# Patient Record
Sex: MALE | Race: WHITE | Employment: OTHER | URBAN - NONMETROPOLITAN AREA
[De-identification: names, ages, dates, MRNs, and addresses within clinical notes are randomized per-mention and may not be internally consistent; named-entity substitution may affect disease eponyms.]

---

## 2017-06-22 ENCOUNTER — OFFICE VISIT (OUTPATIENT)
Dept: FAMILY MEDICINE CLINIC | Age: 47
End: 2017-06-22

## 2017-06-22 VITALS
BODY MASS INDEX: 33.3 KG/M2 | WEIGHT: 224.8 LBS | HEIGHT: 69 IN | DIASTOLIC BLOOD PRESSURE: 80 MMHG | HEART RATE: 76 BPM | SYSTOLIC BLOOD PRESSURE: 130 MMHG

## 2017-06-22 DIAGNOSIS — E78.5 HYPERLIPIDEMIA, UNSPECIFIED HYPERLIPIDEMIA TYPE: Chronic | ICD-10-CM

## 2017-06-22 DIAGNOSIS — R53.82 CHRONIC FATIGUE: ICD-10-CM

## 2017-06-22 DIAGNOSIS — Z00.00 WELL ADULT HEALTH CHECK: Primary | ICD-10-CM

## 2017-06-22 PROCEDURE — 99396 PREV VISIT EST AGE 40-64: CPT | Performed by: FAMILY MEDICINE

## 2017-06-22 RX ORDER — TRAZODONE HYDROCHLORIDE 50 MG/1
50 TABLET ORAL NIGHTLY PRN
Qty: 30 TABLET | Refills: 2 | Status: SHIPPED | OUTPATIENT
Start: 2017-06-22 | End: 2018-06-28 | Stop reason: CLARIF

## 2017-06-22 ASSESSMENT — PATIENT HEALTH QUESTIONNAIRE - PHQ9
1. LITTLE INTEREST OR PLEASURE IN DOING THINGS: 0
SUM OF ALL RESPONSES TO PHQ QUESTIONS 1-9: 0
2. FEELING DOWN, DEPRESSED OR HOPELESS: 0
SUM OF ALL RESPONSES TO PHQ9 QUESTIONS 1 & 2: 0

## 2017-06-22 ASSESSMENT — ENCOUNTER SYMPTOMS
GASTROINTESTINAL NEGATIVE: 1
SHORTNESS OF BREATH: 0
RESPIRATORY NEGATIVE: 1

## 2017-06-23 ENCOUNTER — TELEPHONE (OUTPATIENT)
Dept: FAMILY MEDICINE CLINIC | Age: 47
End: 2017-06-23

## 2017-06-23 LAB
ABSOLUTE BASO #: 0.1 K/UL (ref 0–0.1)
ABSOLUTE EOS #: 0.2 K/UL (ref 0.1–0.4)
ABSOLUTE LYMPH #: 1.3 K/UL (ref 0.8–5.2)
ABSOLUTE MONO #: 0.7 K/UL (ref 0.1–0.9)
ABSOLUTE NEUT #: 3.8 K/UL (ref 1.3–9.1)
ALBUMIN SERPL-MCNC: 4.4 G/DL (ref 3.2–5.3)
ALK PHOSPHATASE: 76 IU/L (ref 35–121)
ALT SERPL-CCNC: 53 IU/L (ref 5–59)
ANION GAP SERPL CALCULATED.3IONS-SCNC: 13 MMOL/L
AST SERPL-CCNC: 24 IU/L (ref 10–42)
BASOPHILS RELATIVE PERCENT: 0.8 %
BILIRUB SERPL-MCNC: 0.6 MG/DL (ref 0.2–1.3)
BUN BLDV-MCNC: 13 MG/DL (ref 10–20)
CALCIUM SERPL-MCNC: 9.7 MG/DL (ref 8.7–10.8)
CHLORIDE BLD-SCNC: 104 MMOL/L (ref 95–111)
CHOLESTEROL/HDL RATIO: 4.5
CHOLESTEROL: 241 MG/DL
CO2: 27 MMOL/L (ref 21–32)
CREAT SERPL-MCNC: 0.8 MG/DL (ref 0.5–1.3)
EGFR AFRICAN AMERICAN: 125
EGFR IF NONAFRICAN AMERICAN: 103
EOSINOPHILS RELATIVE PERCENT: 2.9 %
GLUCOSE: 102 MG/DL (ref 70–100)
HCT VFR BLD CALC: 47.7 % (ref 41.4–51)
HDLC SERPL-MCNC: 54 MG/DL (ref 40–60)
HEMOGLOBIN: 16 G/DL (ref 13.8–17)
LDL CHOLESTEROL CALCULATED: 157 MG/DL
LDL/HDL RATIO: 2.9
LYMPHOCYTE %: 21.6 %
MCH RBC QN AUTO: 31.1 PG (ref 27–34)
MCHC RBC AUTO-ENTMCNC: 33.5 G/DL (ref 31–36)
MCV RBC AUTO: 92.6 FL (ref 80–100)
MONOCYTES # BLD: 12 %
NEUTROPHILS RELATIVE PERCENT: 62.2 %
PDW BLD-RTO: 12.9 % (ref 10.8–14.8)
PLATELETS: 305 K/UL (ref 150–450)
POTASSIUM SERPL-SCNC: 4.8 MMOL/L (ref 3.5–5.4)
RBC: 5.15 M/UL (ref 4–5.5)
SODIUM BLD-SCNC: 139 MMOL/L (ref 134–147)
TESTOSTERONE TOTAL: 159 NG/DL
TOTAL PROTEIN: 6.9 G/DL (ref 5.8–8)
TRIGL SERPL-MCNC: 150 MG/DL
TSH SERPL DL<=0.05 MIU/L-ACNC: 1.56 UIU/ML (ref 0.4–4.4)
VLDLC SERPL CALC-MCNC: 30 MG/DL
WBC: 6.2 K/UL (ref 3.7–10.8)

## 2017-06-29 ENCOUNTER — TELEPHONE (OUTPATIENT)
Dept: FAMILY MEDICINE CLINIC | Age: 47
End: 2017-06-29

## 2018-01-05 ENCOUNTER — OFFICE VISIT (OUTPATIENT)
Dept: FAMILY MEDICINE CLINIC | Age: 48
End: 2018-01-05
Payer: COMMERCIAL

## 2018-01-05 VITALS
BODY MASS INDEX: 34.24 KG/M2 | WEIGHT: 231.2 LBS | HEART RATE: 60 BPM | TEMPERATURE: 97.9 F | HEIGHT: 69 IN | SYSTOLIC BLOOD PRESSURE: 132 MMHG | DIASTOLIC BLOOD PRESSURE: 70 MMHG

## 2018-01-05 DIAGNOSIS — J01.90 ACUTE NON-RECURRENT SINUSITIS, UNSPECIFIED LOCATION: ICD-10-CM

## 2018-01-05 PROCEDURE — 99212 OFFICE O/P EST SF 10 MIN: CPT | Performed by: FAMILY MEDICINE

## 2018-01-05 RX ORDER — AMOXICILLIN AND CLAVULANATE POTASSIUM 500; 125 MG/1; MG/1
1 TABLET, FILM COATED ORAL 2 TIMES DAILY
Qty: 20 TABLET | Refills: 0 | Status: SHIPPED | OUTPATIENT
Start: 2018-01-05 | End: 2018-01-15

## 2018-01-06 NOTE — PROGRESS NOTES
Name:  Kartik Abreu  :    1970    Chief Complaint   Patient presents with    Sinusitis    Cough     No GI  HPI:  One week of URI symptoms and now headache and congestion. Whole family sick. No fever. No treatment. Chest is clear. Physical Exam:      /70 (Site: Left Arm, Position: Sitting, Cuff Size: Medium Adult)   Pulse 60   Temp 97.9 °F (36.6 °C) (Oral)   Ht 5' 9\" (1.753 m)   Wt 231 lb 3.2 oz (104.9 kg)   BMI 34.14 kg/m²     Not ill. ENT:   TM's congested and pink. Phar is red with edema. No nodes. Lungs are clear. Heart in RRR with no murmur. Assessment/Plan:      Sinusitis    Deyvi Doyle was seen today for sinusitis and cough. Diagnoses and all orders for this visit:    Acute non-recurrent sinusitis, unspecified location  -     amoxicillin-clavulanate (AUGMENTIN) 500-125 MG per tablet;  Take 1 tablet by mouth 2 times daily for 10 days

## 2018-02-07 ENCOUNTER — HOSPITAL ENCOUNTER (EMERGENCY)
Age: 48
Discharge: HOME | End: 2018-02-07
Payer: COMMERCIAL

## 2018-02-07 VITALS
WEIGHT: 230 LBS | SYSTOLIC BLOOD PRESSURE: 137 MMHG | HEART RATE: 79 BPM | DIASTOLIC BLOOD PRESSURE: 83 MMHG | TEMPERATURE: 97.9 F | HEIGHT: 69 IN | BODY MASS INDEX: 34.07 KG/M2 | OXYGEN SATURATION: 95 %

## 2018-02-07 DIAGNOSIS — L24.89 IRRITANT CONTACT DERMATITIS DUE TO OTHER AGENTS: Primary | ICD-10-CM

## 2018-02-07 PROCEDURE — 6360000002 HC RX W HCPCS: Performed by: NURSE PRACTITIONER

## 2018-02-07 PROCEDURE — 96372 THER/PROPH/DIAG INJ SC/IM: CPT

## 2018-02-07 PROCEDURE — 99212 OFFICE O/P EST SF 10 MIN: CPT

## 2018-02-07 RX ORDER — METHYLPREDNISOLONE 4 MG/1
TABLET ORAL
Qty: 1 KIT | Refills: 0 | Status: SHIPPED | OUTPATIENT
Start: 2018-02-07 | End: 2018-02-13

## 2018-02-07 RX ORDER — METHYLPREDNISOLONE ACETATE 80 MG/ML
80 INJECTION, SUSPENSION INTRA-ARTICULAR; INTRALESIONAL; INTRAMUSCULAR; SOFT TISSUE ONCE
Status: COMPLETED | OUTPATIENT
Start: 2018-02-07 | End: 2018-02-07

## 2018-02-07 RX ADMIN — METHYLPREDNISOLONE ACETATE 80 MG: 80 INJECTION, SUSPENSION INTRA-ARTICULAR; INTRALESIONAL; INTRAMUSCULAR; SOFT TISSUE at 15:00

## 2018-02-07 ASSESSMENT — ENCOUNTER SYMPTOMS
EYE DISCHARGE: 0
RHINORRHEA: 0
NAUSEA: 0
SHORTNESS OF BREATH: 0
WHEEZING: 0
COUGH: 0
CONSTIPATION: 0
SORE THROAT: 0
STRIDOR: 0
EYE ITCHING: 1
ABDOMINAL PAIN: 0
COLOR CHANGE: 0
DIARRHEA: 0
VOMITING: 0
BACK PAIN: 0
EYE PAIN: 0

## 2018-06-28 ENCOUNTER — OFFICE VISIT (OUTPATIENT)
Dept: FAMILY MEDICINE CLINIC | Age: 48
End: 2018-06-28
Payer: COMMERCIAL

## 2018-06-28 VITALS — SYSTOLIC BLOOD PRESSURE: 120 MMHG | HEART RATE: 70 BPM | DIASTOLIC BLOOD PRESSURE: 80 MMHG

## 2018-06-28 DIAGNOSIS — F51.01 PRIMARY INSOMNIA: ICD-10-CM

## 2018-06-28 DIAGNOSIS — Z00.00 WELL ADULT HEALTH CHECK: Primary | ICD-10-CM

## 2018-06-28 PROCEDURE — 99396 PREV VISIT EST AGE 40-64: CPT | Performed by: FAMILY MEDICINE

## 2018-06-28 RX ORDER — TRAZODONE HYDROCHLORIDE 50 MG/1
50 TABLET ORAL NIGHTLY PRN
Qty: 30 TABLET | Refills: 2 | Status: SHIPPED | OUTPATIENT
Start: 2018-06-28 | End: 2019-07-04 | Stop reason: ALTCHOICE

## 2018-06-28 ASSESSMENT — PATIENT HEALTH QUESTIONNAIRE - PHQ9
1. LITTLE INTEREST OR PLEASURE IN DOING THINGS: 0
2. FEELING DOWN, DEPRESSED OR HOPELESS: 0
SUM OF ALL RESPONSES TO PHQ9 QUESTIONS 1 & 2: 0
SUM OF ALL RESPONSES TO PHQ QUESTIONS 1-9: 0

## 2018-06-29 ENCOUNTER — NURSE ONLY (OUTPATIENT)
Dept: FAMILY MEDICINE CLINIC | Age: 48
End: 2018-06-29
Payer: COMMERCIAL

## 2018-06-29 DIAGNOSIS — Z00.00 WELL ADULT HEALTH CHECK: ICD-10-CM

## 2018-06-29 LAB
CHOLESTEROL, TOTAL: 213 MG/DL (ref 100–199)
GLUCOSE FASTING: 103 MG/DL (ref 70–108)
HDLC SERPL-MCNC: 49 MG/DL
LDL CHOLESTEROL CALCULATED: 136 MG/DL
TRIGL SERPL-MCNC: 139 MG/DL (ref 0–199)

## 2018-06-29 PROCEDURE — 36415 COLL VENOUS BLD VENIPUNCTURE: CPT | Performed by: FAMILY MEDICINE

## 2018-06-29 ASSESSMENT — ENCOUNTER SYMPTOMS
SHORTNESS OF BREATH: 0
RESPIRATORY NEGATIVE: 1
GASTROINTESTINAL NEGATIVE: 1
ABDOMINAL PAIN: 0

## 2018-12-03 ENCOUNTER — OFFICE VISIT (OUTPATIENT)
Dept: FAMILY MEDICINE CLINIC | Age: 48
End: 2018-12-03
Payer: COMMERCIAL

## 2018-12-03 VITALS
SYSTOLIC BLOOD PRESSURE: 128 MMHG | WEIGHT: 217.6 LBS | HEIGHT: 69 IN | HEART RATE: 80 BPM | TEMPERATURE: 99.3 F | BODY MASS INDEX: 32.23 KG/M2 | DIASTOLIC BLOOD PRESSURE: 70 MMHG

## 2018-12-03 DIAGNOSIS — R10.31 RIGHT LOWER QUADRANT ABDOMINAL PAIN: Primary | ICD-10-CM

## 2018-12-03 PROCEDURE — 99213 OFFICE O/P EST LOW 20 MIN: CPT | Performed by: FAMILY MEDICINE

## 2018-12-03 RX ORDER — NAPROXEN 250 MG/1
250 TABLET ORAL 2 TIMES DAILY WITH MEALS
COMMUNITY
End: 2019-07-04 | Stop reason: ALTCHOICE

## 2018-12-03 NOTE — PROGRESS NOTES
Name:  Maricel Elias  :    1970    Chief Complaint   Patient presents with    Abdominal Pain     3 weeks       HPI:  52 y/o male who is status post total colectomy now having 3 weeks of progressive RLQ pain that is referred into right hip girdle. Affecting gait. Affecting sleep. No injury. No fever. No  symptoms. No weight loss or melena. Physical Exam:      /70 (Site: Left Upper Arm, Position: Sitting, Cuff Size: Large Adult)   Pulse 80   Temp 99.3 °F (37.4 °C) (Oral)   Ht 5' 9\" (1.753 m)   Wt 217 lb 9.6 oz (98.7 kg)   BMI 32.13 kg/m²     Not ill. Neck normal.   Lungs are clear. Heart in RRR with no murmur. ABD is soft with moderate fullness and tenderness over [place] that is referred to mid lower abd and right hip. Good painless passive hip motion. Pain with valsalva. Quiet abd. Assessment/Plan:      3 weeks of progressive ABD pain  We need to image, as time is not relieving    Nikolai El was seen today for abdominal pain. Diagnoses and all orders for this visit:    Right lower quadrant abdominal pain  -     CT ABDOMEN W CONTRAST; Future        2018  Need to change CT order. ABD and Pelvis.   William Mendez

## 2018-12-07 ENCOUNTER — TELEPHONE (OUTPATIENT)
Dept: FAMILY MEDICINE CLINIC | Age: 48
End: 2018-12-07

## 2018-12-12 ENCOUNTER — TELEPHONE (OUTPATIENT)
Dept: FAMILY MEDICINE CLINIC | Age: 48
End: 2018-12-12

## 2018-12-13 ENCOUNTER — HOSPITAL ENCOUNTER (OUTPATIENT)
Dept: CT IMAGING | Age: 48
Discharge: HOME | End: 2018-12-13
Payer: COMMERCIAL

## 2018-12-13 DIAGNOSIS — R10.31 RIGHT LOWER QUADRANT ABDOMINAL PAIN: ICD-10-CM

## 2018-12-13 PROCEDURE — 74177 CT ABD & PELVIS W/CONTRAST: CPT

## 2018-12-13 PROCEDURE — 6360000004 HC RX CONTRAST MEDICATION: Performed by: FAMILY MEDICINE

## 2018-12-13 RX ADMIN — IOHEXOL 50 ML: 240 INJECTION, SOLUTION INTRATHECAL; INTRAVASCULAR; INTRAVENOUS; ORAL at 13:54

## 2018-12-13 RX ADMIN — IOPAMIDOL 85 ML: 755 INJECTION, SOLUTION INTRAVENOUS at 13:54

## 2018-12-14 ENCOUNTER — TELEPHONE (OUTPATIENT)
Dept: FAMILY MEDICINE CLINIC | Age: 48
End: 2018-12-14

## 2018-12-19 ENCOUNTER — HOSPITAL ENCOUNTER (OUTPATIENT)
Dept: GENERAL RADIOLOGY | Age: 48
Discharge: HOME | End: 2018-12-19
Payer: COMMERCIAL

## 2018-12-19 ENCOUNTER — HOSPITAL ENCOUNTER (OUTPATIENT)
Age: 48
Discharge: HOME | End: 2018-12-19
Payer: COMMERCIAL

## 2018-12-19 ENCOUNTER — OFFICE VISIT (OUTPATIENT)
Dept: FAMILY MEDICINE CLINIC | Age: 48
End: 2018-12-19
Payer: COMMERCIAL

## 2018-12-19 VITALS
WEIGHT: 219.4 LBS | DIASTOLIC BLOOD PRESSURE: 70 MMHG | SYSTOLIC BLOOD PRESSURE: 124 MMHG | HEART RATE: 68 BPM | BODY MASS INDEX: 32.4 KG/M2

## 2018-12-19 DIAGNOSIS — M25.551 RIGHT HIP PAIN: ICD-10-CM

## 2018-12-19 DIAGNOSIS — M60.9 MYOSITIS OF RIGHT LOWER EXTREMITY, UNSPECIFIED MYOSITIS TYPE: ICD-10-CM

## 2018-12-19 DIAGNOSIS — M60.9 MYOSITIS OF RIGHT LOWER EXTREMITY, UNSPECIFIED MYOSITIS TYPE: Primary | ICD-10-CM

## 2018-12-19 PROCEDURE — 99213 OFFICE O/P EST LOW 20 MIN: CPT | Performed by: FAMILY MEDICINE

## 2018-12-19 PROCEDURE — 72100 X-RAY EXAM L-S SPINE 2/3 VWS: CPT

## 2018-12-19 RX ORDER — PREDNISONE 10 MG/1
TABLET ORAL
Qty: 30 TABLET | Refills: 0 | Status: SHIPPED | OUTPATIENT
Start: 2018-12-19 | End: 2019-07-04 | Stop reason: ALTCHOICE

## 2019-02-19 ENCOUNTER — PROCEDURE VISIT (OUTPATIENT)
Dept: FAMILY MEDICINE CLINIC | Age: 49
End: 2019-02-19
Payer: COMMERCIAL

## 2019-02-19 DIAGNOSIS — S61.411A LACERATION OF RIGHT HAND WITHOUT FOREIGN BODY, INITIAL ENCOUNTER: Primary | ICD-10-CM

## 2019-02-19 PROCEDURE — 12001 RPR S/N/AX/GEN/TRNK 2.5CM/<: CPT | Performed by: FAMILY MEDICINE

## 2019-02-19 PROCEDURE — 99212 OFFICE O/P EST SF 10 MIN: CPT | Performed by: FAMILY MEDICINE

## 2019-02-19 RX ORDER — CEPHALEXIN 500 MG/1
500 CAPSULE ORAL 3 TIMES DAILY
Qty: 21 CAPSULE | Refills: 0 | Status: SHIPPED | OUTPATIENT
Start: 2019-02-19 | End: 2019-02-26

## 2019-07-04 ENCOUNTER — HOSPITAL ENCOUNTER (EMERGENCY)
Age: 49
Discharge: HOME | End: 2019-07-04
Attending: EMERGENCY MEDICINE
Payer: COMMERCIAL

## 2019-07-04 VITALS
OXYGEN SATURATION: 95 % | SYSTOLIC BLOOD PRESSURE: 137 MMHG | TEMPERATURE: 97.8 F | DIASTOLIC BLOOD PRESSURE: 81 MMHG | RESPIRATION RATE: 16 BRPM | HEART RATE: 64 BPM | BODY MASS INDEX: 33.23 KG/M2 | WEIGHT: 225 LBS

## 2019-07-04 DIAGNOSIS — H61.22 HEARING LOSS SECONDARY TO CERUMEN IMPACTION, LEFT: ICD-10-CM

## 2019-07-04 DIAGNOSIS — S16.1XXA ACUTE CERVICAL MYOFASCIAL STRAIN, INITIAL ENCOUNTER: ICD-10-CM

## 2019-07-04 DIAGNOSIS — T16.2XXA FOREIGN BODY OF LEFT EAR, INITIAL ENCOUNTER: Primary | ICD-10-CM

## 2019-07-04 PROCEDURE — 99213 OFFICE O/P EST LOW 20 MIN: CPT

## 2019-07-04 PROCEDURE — 69210 REMOVE IMPACTED EAR WAX UNI: CPT

## 2019-07-04 PROCEDURE — 2709999900 HC NON-CHARGEABLE SUPPLY

## 2019-07-04 PROCEDURE — 99214 OFFICE O/P EST MOD 30 MIN: CPT | Performed by: EMERGENCY MEDICINE

## 2019-07-04 PROCEDURE — 69200 CLEAR OUTER EAR CANAL: CPT | Performed by: EMERGENCY MEDICINE

## 2019-07-04 RX ORDER — IBUPROFEN 400 MG/1
400 TABLET ORAL EVERY 6 HOURS PRN
COMMUNITY
End: 2022-03-16 | Stop reason: ALTCHOICE

## 2019-07-04 RX ORDER — NAPROXEN 500 MG/1
500 TABLET ORAL 2 TIMES DAILY WITH MEALS
Qty: 20 TABLET | Refills: 0 | Status: SHIPPED | OUTPATIENT
Start: 2019-07-04 | End: 2022-03-16 | Stop reason: ALTCHOICE

## 2019-07-04 RX ORDER — ACETAMINOPHEN 500 MG
1000 TABLET ORAL EVERY 6 HOURS PRN
COMMUNITY

## 2019-07-04 RX ORDER — TIZANIDINE 4 MG/1
4 TABLET ORAL EVERY 6 HOURS PRN
Qty: 15 TABLET | Refills: 0 | Status: SHIPPED | OUTPATIENT
Start: 2019-07-04 | End: 2021-05-13

## 2019-07-04 ASSESSMENT — ENCOUNTER SYMPTOMS
DIARRHEA: 0
SINUS PRESSURE: 0
SHORTNESS OF BREATH: 0
TROUBLE SWALLOWING: 0
EYE REDNESS: 0
NAUSEA: 0
BACK PAIN: 0
WHEEZING: 0
COUGH: 0
ABDOMINAL PAIN: 0
EYE PAIN: 0
VOICE CHANGE: 0
SORE THROAT: 0
STRIDOR: 0
VOMITING: 0
EYE DISCHARGE: 0

## 2019-07-04 ASSESSMENT — PAIN DESCRIPTION - LOCATION: LOCATION: NECK

## 2019-07-04 ASSESSMENT — PAIN - FUNCTIONAL ASSESSMENT: PAIN_FUNCTIONAL_ASSESSMENT: ACTIVITIES ARE NOT PREVENTED

## 2019-07-04 ASSESSMENT — PAIN DESCRIPTION - ORIENTATION: ORIENTATION: POSTERIOR

## 2019-07-04 ASSESSMENT — PAIN DESCRIPTION - DESCRIPTORS: DESCRIPTORS: SHARP

## 2019-07-04 ASSESSMENT — PAIN DESCRIPTION - FREQUENCY: FREQUENCY: INTERMITTENT

## 2019-07-04 ASSESSMENT — PAIN SCALES - GENERAL: PAINLEVEL_OUTOF10: 4

## 2019-07-04 ASSESSMENT — PAIN DESCRIPTION - PAIN TYPE: TYPE: ACUTE PAIN

## 2019-07-04 NOTE — ED PROVIDER NOTES
[phone]       Chief Complaint   Patient presents with    Ear Fullness     dizziness, left ear    Neck Pain     posterior neck pain       Nurses Notes reviewed and I agree except as noted in the HPI. HISTORY OF PRESENT ILLNESS   Janneth Botello is a 47 y.o. male who presents with left ear pain he rates a 4 out of 10 severity associated with dizziness and decreased hearing. Symptoms present for over 1 month. At times wears hearing aids, none recently. No purulent drainage, bleeding, ear trauma, fever, sore throat, upper respiratory symptoms. In addition, patient has right-sided neck pain with movements, particularly turning his head to the right. This occurred after twisting injury 1 week prior at his home in [facility]. .  No loss of consciousness, headache, motor or sensory deficits, chest pain, shortness of breath. Non-smoker. No history of ruptured tympanic membrane. REVIEW OF SYSTEMS     Review of Systems   Constitutional: Negative for appetite change, chills, fatigue, fever and unexpected weight change. HENT: Positive for ear pain and hearing loss. Negative for congestion, ear discharge, sinus pressure, sneezing, sore throat, trouble swallowing and voice change. Eyes: Negative for pain, discharge and redness. Respiratory: Negative for cough, shortness of breath, wheezing and stridor. Cardiovascular: Negative for chest pain and leg swelling. Gastrointestinal: Negative for abdominal pain, diarrhea, nausea and vomiting. Genitourinary: Negative for dysuria, frequency, hematuria and urgency. Musculoskeletal: Positive for neck pain and neck stiffness. Negative for arthralgias, back pain and myalgias. Right lateral neck pain and stiffness after twisting injury   Skin: Negative for rash. Neurological: Negative for dizziness, syncope, weakness and headaches. Hematological: Negative for adenopathy.

## 2019-07-04 NOTE — ED NOTES
Discharge instructions reviewed with pt, instructed pt to go directly to main er if pain worsens. Pt verbalizes understanding, discharged in stable condition.       Sheeba Tate RN  07/04/19 [id number]

## 2019-07-04 NOTE — ED TRIAGE NOTES
Patient ambulated to rm 5, with spouse, left ear pressure since May, in [place] went down water slide, left ear popped. One wk ago. Mowing grass with a riding , ducked to avoid a tree branch, hit forehead  on metal clothes line post, posterior neck pain since. , painful to turn his head to the right. Intermittent dizziness with ear pressure.

## 2021-05-11 ENCOUNTER — NURSE TRIAGE (OUTPATIENT)
Dept: OTHER | Facility: CLINIC | Age: 51
End: 2021-05-11

## 2021-05-11 NOTE — TELEPHONE ENCOUNTER
SYMPTOMS: \"Do you have any other symptoms? \" (e.g., fever, neck pain, numbness)      Denies     9. PREGNANCY: \"Is there any chance you are pregnant? \" \"When was your last menstrual period? \"      N/a    Protocols used:  FINGER PAIN-ADULT-AH

## 2021-05-13 ENCOUNTER — OFFICE VISIT (OUTPATIENT)
Dept: FAMILY MEDICINE CLINIC | Age: 51
End: 2021-05-13
Payer: COMMERCIAL

## 2021-05-13 ENCOUNTER — HOSPITAL ENCOUNTER (OUTPATIENT)
Dept: GENERAL RADIOLOGY | Age: 51
Discharge: HOME | End: 2021-05-13
Payer: COMMERCIAL

## 2021-05-13 ENCOUNTER — HOSPITAL ENCOUNTER (OUTPATIENT)
Age: 51
Discharge: HOME | End: 2021-05-13
Payer: COMMERCIAL

## 2021-05-13 VITALS
RESPIRATION RATE: 16 BRPM | HEIGHT: 69 IN | SYSTOLIC BLOOD PRESSURE: 132 MMHG | OXYGEN SATURATION: 98 % | HEART RATE: 68 BPM | TEMPERATURE: 97.3 F | BODY MASS INDEX: 34.9 KG/M2 | DIASTOLIC BLOOD PRESSURE: 84 MMHG | WEIGHT: 235.6 LBS

## 2021-05-13 DIAGNOSIS — R20.0 NUMBNESS IN BOTH HANDS: ICD-10-CM

## 2021-05-13 DIAGNOSIS — M65.342 TRIGGER RING FINGER OF LEFT HAND: ICD-10-CM

## 2021-05-13 DIAGNOSIS — M65.342 TRIGGER RING FINGER OF LEFT HAND: Primary | ICD-10-CM

## 2021-05-13 PROCEDURE — 73130 X-RAY EXAM OF HAND: CPT

## 2021-05-13 PROCEDURE — 99213 OFFICE O/P EST LOW 20 MIN: CPT | Performed by: FAMILY MEDICINE

## 2021-05-13 PROCEDURE — 20550 NJX 1 TENDON SHEATH/LIGAMENT: CPT | Performed by: FAMILY MEDICINE

## 2021-05-13 RX ORDER — LIDOCAINE HYDROCHLORIDE 10 MG/ML
1 INJECTION, SOLUTION INFILTRATION; PERINEURAL ONCE
Status: COMPLETED | OUTPATIENT
Start: 2021-05-13 | End: 2021-05-13

## 2021-05-13 RX ORDER — TRIAMCINOLONE ACETONIDE 40 MG/ML
20 INJECTION, SUSPENSION INTRA-ARTICULAR; INTRAMUSCULAR ONCE
Status: COMPLETED | OUTPATIENT
Start: 2021-05-13 | End: 2021-05-13

## 2021-05-13 RX ADMIN — TRIAMCINOLONE ACETONIDE 20 MG: 40 INJECTION, SUSPENSION INTRA-ARTICULAR; INTRAMUSCULAR at 10:15

## 2021-05-13 RX ADMIN — LIDOCAINE HYDROCHLORIDE 1 ML: 10 INJECTION, SOLUTION INFILTRATION; PERINEURAL at 10:15

## 2021-05-13 SDOH — HEALTH STABILITY: MENTAL HEALTH: HOW OFTEN DO YOU HAVE A DRINK CONTAINING ALCOHOL?: 2-3 TIMES A WEEK

## 2021-05-13 ASSESSMENT — PATIENT HEALTH QUESTIONNAIRE - PHQ9
SUM OF ALL RESPONSES TO PHQ QUESTIONS 1-9: 0
SUM OF ALL RESPONSES TO PHQ9 QUESTIONS 1 & 2: 0
1. LITTLE INTEREST OR PLEASURE IN DOING THINGS: 0

## 2021-05-13 ASSESSMENT — ENCOUNTER SYMPTOMS
WHEEZING: 0
SHORTNESS OF BREATH: 0

## 2021-05-13 NOTE — PROGRESS NOTES
[facility]ESSION[facility]  [facility]  [address] [facility]  Dept: [phone]  Dept Fax: [phone]  Loc: [phone]  PROGRESS NOTE      Visit Date: 5/13/2021    Loli North is a 45 y.o. male who presents today for:  Chief Complaint   Patient presents with    New Patient    Hand Pain     left    Finger Pain     ring finger/swollen x 2 months        Subjective:  HPI      Left ring finger locking and triggering. Started 1.5-2 months ago. Worsening. Drops objects with left hand. Right hand dominant. Numbness in right hand with driving. Started 1-2 years ago. No neck pain. Reports numbness in forearm as well. Sometimes has tingling in left forearm and hand. Gets occasional right ring finger triggering. Hx of neck neck surgery remove tumor in posterior soft tissues. Needs to schedule colonoscopy. Has FAP s/p colon resection    Last seen 2.5 yrs ago by Dr. Himanshu Bran. Works as taxidermist    Daughter Siria Perales is present    Review of Systems   Constitutional: Negative for chills and fever. Respiratory: Negative for shortness of breath and wheezing. Musculoskeletal: Negative for neck pain. Skin: Negative for rash and wound. Neurological: Positive for weakness and numbness.      Patient Active Problem List   Diagnosis    Family history of non-Hodgkin's lymphoma    Visual disturbance    FAP (familial adenomatous polyposis)    Atrial fibrillation (HCC)---paroxysmal    Hyperlipidemia     Past Medical History:   Diagnosis Date    Atrial fibrillation ([address]     FAP (familial adenomatous polyposis)     Hyperlipidemia     diet    Nausea & vomiting     Osteoarthritis     Tachycardia     with surgery      Past Surgical History:   Procedure Laterality Date    BRAIN TUMOR EXCISION  12-     3-    CARDIAC CATHETERIZATION      COLON SURGERY  2000    large colon-internal pouch    COLONOSCOPY      CYST REMOVAL 12-31-12    NECK SURGERY  12/31/12    Dr Sharona Turk- Excision of Mass in the nape of Neck    NOSE SURGERY  age 15    OTHER SURGICAL HISTORY  2008    lump removed from testicles    SHOULDER SURGERY  1989 92 89    right x1, left x2-rotator cuff    TEMPOROMANDIBULAR JOINT SURGERY  2008    TONSILLECTOMY  as a child     Family History   Problem Relation Age of Onset    High Blood Pressure Father     Heart Disease Father     Kidney Disease Sister     Cancer Mother     Heart Disease Mother     Cancer Other      Social History     Tobacco Use    Smoking status: Never Smoker    Smokeless tobacco: Never Used   Substance Use Topics    Alcohol use: Yes     Frequency: 2-3 times a week     Drinks per session: 1 or 2     Comment: social      Current Outpatient Medications   Medication Sig Dispense Refill    acetaminophen (TYLENOL) 500 MG tablet Take 1,000 mg by mouth every 6 hours as needed for Pain      ibuprofen (ADVIL;MOTRIN) 400 MG tablet Take 400 mg by mouth every 6 hours as needed for Pain      naproxen (NAPROSYN) 500 MG tablet Take 1 tablet by mouth 2 times daily (with meals) for 20 doses 20 tablet 0    DHEA 10 MG CAPS Take 1 tablet by mouth daily       No current facility-administered medications for this visit.       Allergies   Allergen Reactions    Latex     Dye [Iodides] Hives     Health Maintenance   Topic Date Due    Hepatitis C screen  Never done    HIV screen  Never done    DTaP/Tdap/Td vaccine (1 - Tdap) 05/03/1988    Shingles Vaccine (1 of 2) Never done    Colon cancer screen colonoscopy  Never done    Diabetes screen  06/29/2021    Flu vaccine (Season Ended) 09/01/2021    Lipid screen  06/29/2023    COVID-19 Vaccine  Completed    Hepatitis A vaccine  Aged Out    Hepatitis B vaccine  Aged Out    Hib vaccine  Aged Out    Meningococcal (ACWY) vaccine  Aged Out    Pneumococcal 0-64 years Vaccine  Aged Out       Objective:  /84 (Site: Left Upper Arm, Position: Sitting)   Pulse 68 to ice the hand for 10-15 minutes to relieve any injection site related pain. Left hand x-rays were obtained and reviewed today: No acute fracture. Bone cysts in the third metacarpal.  Appears to have arthritic chnages of the first [facility]. final radiology read is pending    Impression/Plan:  1. Trigger ring finger of left hand  New problem of left hand trigger finger of the ring finger. Discussed in detail. X-ray was obtained today. Recommend steroid injection given it is impacting his daily work as a taxidermist.  No guarantees were made regarding the injection. If the injection is ineffective, the next step would be surgical consultation  - XR HAND LEFT (MIN 3 VIEWS); Future  - CA INJECT TENDON SHEATH/LIGAMENT  - lidocaine 1 % injection 1 mL  - triamcinolone acetonide (KENALOG-40) injection 20 mg    2. Numbness in both hands  Numbness of both hands. Differential diagnoses include carpal tunnel syndrome, ulnar neuropathy, and cervical radiculopathy. We will proceed with EMG to further evaluate the etiology so we can form a management plan based on this  - [facility]. [facility]'s Neurology (EMG) - Yan Gomez MD      They voiced understanding. All questions answered. They agreed with treatment plan. See patient instructions for any educational materials that may have been given. Discussed use, benefit, and side effects of prescribed medications. Reviewed health maintenance. (Please note that portions of this note may have been completed with a voice recognition program.  Efforts were made to edit the dictation but occasionally words are mis-transcribed.)    Return in about 4 weeks (around 6/10/2021) for Well.       Electronically signed by Sofy Crum MD on 5/13/2021 at 9:28 AM

## 2021-05-27 ENCOUNTER — HOSPITAL ENCOUNTER (OUTPATIENT)
Age: 51
Discharge: HOME | End: 2021-05-27
Payer: COMMERCIAL

## 2021-05-27 LAB
ALBUMIN SERPL-MCNC: 4.3 G/DL (ref 3.5–5.1)
ALP BLD-CCNC: 75 U/L (ref 38–126)
ALT SERPL-CCNC: 50 U/L (ref 11–66)
ANION GAP SERPL CALCULATED.3IONS-SCNC: 10 MEQ/L (ref 8–16)
AST SERPL-CCNC: 29 U/L (ref 5–40)
BASOPHILS # BLD: 0.5 %
BASOPHILS ABSOLUTE: 0 THOU/MM3 (ref 0–0.1)
BILIRUB SERPL-MCNC: 0.5 MG/DL (ref 0.3–1.2)
BUN BLDV-MCNC: 10 MG/DL (ref 7–22)
CALCIUM SERPL-MCNC: 9.5 MG/DL (ref 8.5–10.5)
CHLORIDE BLD-SCNC: 104 MEQ/L (ref 98–111)
CO2: 27 MEQ/L (ref 23–33)
CREAT SERPL-MCNC: 0.8 MG/DL (ref 0.4–1.2)
EOSINOPHIL # BLD: 1.5 %
EOSINOPHILS ABSOLUTE: 0.1 THOU/MM3 (ref 0–0.4)
ERYTHROCYTE [DISTWIDTH] IN BLOOD BY AUTOMATED COUNT: 13.2 % (ref 11.5–14.5)
ERYTHROCYTE [DISTWIDTH] IN BLOOD BY AUTOMATED COUNT: 48.1 FL (ref 35–45)
FOLATE: 10.2 NG/ML (ref 4.8–24.2)
GFR SERPL CREATININE-BSD FRML MDRD: > 90 ML/MIN/1.73M2
GLUCOSE BLD-MCNC: 98 MG/DL (ref 70–108)
HCT VFR BLD CALC: 49.6 % (ref 42–52)
HEMOGLOBIN: 16.5 GM/DL (ref 14–18)
IMMATURE GRANS (ABS): 0.02 THOU/MM3 (ref 0–0.07)
IMMATURE GRANULOCYTES: 0.3 %
IRON: 101 UG/DL (ref 65–195)
LYMPHOCYTES # BLD: 25.6 %
LYMPHOCYTES ABSOLUTE: 1.5 THOU/MM3 (ref 1–4.8)
MCH RBC QN AUTO: 32.5 PG (ref 26–33)
MCHC RBC AUTO-ENTMCNC: 33.3 GM/DL (ref 32.2–35.5)
MCV RBC AUTO: 97.6 FL (ref 80–94)
MONOCYTES # BLD: 12.1 %
MONOCYTES ABSOLUTE: 0.7 THOU/MM3 (ref 0.4–1.3)
NUCLEATED RED BLOOD CELLS: 0 /100 WBC
PLATELET # BLD: 296 THOU/MM3 (ref 130–400)
PMV BLD AUTO: 10.3 FL (ref 9.4–12.4)
POTASSIUM SERPL-SCNC: 4.4 MEQ/L (ref 3.5–5.2)
RBC # BLD: 5.08 MILL/MM3 (ref 4.7–6.1)
SEG NEUTROPHILS: 60 %
SEGMENTED NEUTROPHILS ABSOLUTE COUNT: 3.5 THOU/MM3 (ref 1.8–7.7)
SODIUM BLD-SCNC: 141 MEQ/L (ref 135–145)
TOTAL IRON BINDING CAPACITY: 268 UG/DL (ref 171–450)
TOTAL PROTEIN: 7.3 G/DL (ref 6.1–8)
TSH SERPL DL<=0.05 MIU/L-ACNC: 1.34 UIU/ML (ref 0.4–4.2)
VITAMIN B-12: 599 PG/ML (ref 211–911)
WBC # BLD: 5.9 THOU/MM3 (ref 4.8–10.8)

## 2021-05-27 PROCEDURE — 82746 ASSAY OF FOLIC ACID SERUM: CPT

## 2021-05-27 PROCEDURE — 82607 VITAMIN B-12: CPT

## 2021-05-27 PROCEDURE — 36415 COLL VENOUS BLD VENIPUNCTURE: CPT

## 2021-05-27 PROCEDURE — 84238 ASSAY NONENDOCRINE RECEPTOR: CPT

## 2021-05-27 PROCEDURE — 85025 COMPLETE CBC W/AUTO DIFF WBC: CPT

## 2021-05-27 PROCEDURE — 84207 ASSAY OF VITAMIN B-6: CPT

## 2021-05-27 PROCEDURE — 83540 ASSAY OF IRON: CPT

## 2021-05-27 PROCEDURE — 80053 COMPREHEN METABOLIC PANEL: CPT

## 2021-05-27 PROCEDURE — 84443 ASSAY THYROID STIM HORMONE: CPT

## 2021-05-27 PROCEDURE — 83550 IRON BINDING TEST: CPT

## 2021-05-30 LAB — SOLUBLE TRANSFERRIN RECEPT: 2.8 MG/L (ref 2.2–5)

## 2021-05-31 LAB — VITAMIN B6: 27.2 NMOL/L (ref 20–125)

## 2021-06-01 ENCOUNTER — PROCEDURE VISIT (OUTPATIENT)
Dept: NEUROLOGY | Age: 51
End: 2021-06-01
Payer: COMMERCIAL

## 2021-06-01 DIAGNOSIS — R20.0 BILATERAL HAND NUMBNESS: Primary | ICD-10-CM

## 2021-06-01 DIAGNOSIS — M54.12 CERVICAL RADICULOPATHY: ICD-10-CM

## 2021-06-01 DIAGNOSIS — G56.03 BILATERAL CARPAL TUNNEL SYNDROME: ICD-10-CM

## 2021-06-01 PROCEDURE — 95886 MUSC TEST DONE W/N TEST COMP: CPT | Performed by: PSYCHIATRY & NEUROLOGY

## 2021-06-01 PROCEDURE — 95911 NRV CNDJ TEST 9-10 STUDIES: CPT | Performed by: PSYCHIATRY & NEUROLOGY

## 2021-06-03 ENCOUNTER — HOSPITAL ENCOUNTER (OUTPATIENT)
Dept: CT IMAGING | Age: 51
Discharge: HOME | End: 2021-06-03
Payer: COMMERCIAL

## 2021-06-03 DIAGNOSIS — Z83.71 FAMILY HISTORY OF COLONIC POLYPS: ICD-10-CM

## 2021-06-03 PROCEDURE — 6360000004 HC RX CONTRAST MEDICATION: Performed by: NURSE PRACTITIONER

## 2021-06-03 PROCEDURE — 74177 CT ABD & PELVIS W/CONTRAST: CPT

## 2021-06-03 RX ADMIN — IOPAMIDOL 80 ML: 755 INJECTION, SOLUTION INTRAVENOUS at 18:18

## 2021-06-07 ENCOUNTER — TELEPHONE (OUTPATIENT)
Dept: FAMILY MEDICINE CLINIC | Age: 51
End: 2021-06-07

## 2021-06-07 PROBLEM — M54.12 RIGHT CERVICAL RADICULOPATHY: Status: ACTIVE | Noted: 2021-06-07

## 2021-06-07 PROBLEM — G56.03 BILATERAL CARPAL TUNNEL SYNDROME: Status: ACTIVE | Noted: 2021-06-07

## 2021-06-07 PROBLEM — R94.131 ABNORMAL EMG: Status: ACTIVE | Noted: 2021-06-07

## 2021-06-07 NOTE — TELEPHONE ENCOUNTER
----- Message from Yetta Crigler, MD sent at 6/7/2021  5:36 AM EDT -----  EMG shows bilateral carpal tunnel syndrome and chronic right cervical radiculopathy. He has already been referred to [facility] Dr. Joana Stone for the wrists by neurology. I recommend xray and MRI of neck. Does he want me to order it or does he want to talk with Dr. Joana Stone about it? Please advise patient.   Yetta Crigler, MD

## 2021-06-09 ENCOUNTER — NURSE ONLY (OUTPATIENT)
Dept: LAB | Age: 51
End: 2021-06-09

## 2022-01-12 ENCOUNTER — TELEPHONE (OUTPATIENT)
Dept: SURGERY | Age: 52
End: 2022-01-12

## 2022-01-12 DIAGNOSIS — K43.2 INCISIONAL HERNIA, WITHOUT OBSTRUCTION OR GANGRENE: Primary | ICD-10-CM

## 2022-01-12 NOTE — TELEPHONE ENCOUNTER
Per Maria Joseph at [facility], no precert is required for CPT code 10906  Call ref # [id number]

## 2022-01-12 NOTE — TELEPHONE ENCOUNTER
Spoke w/ pt, CT scan abd/pelvis scheduled 1/17 [facility], arrive at 8 am, NPO 4 hours prior. Pt will need premedicated due to allergy to iodine.   Appointment with Dr. Stefania Amaya scheduled 1/26, 8:15 am, pt voiced understanding

## 2022-01-13 ENCOUNTER — TELEPHONE (OUTPATIENT)
Dept: FAMILY MEDICINE CLINIC | Age: 52
End: 2022-01-13

## 2022-01-13 NOTE — TELEPHONE ENCOUNTER
Called pt to let him know that Dr Teressa Mcgill would need to see him before he can order and medication since he hasn't seen the Pt since May of 2021 pt was upset he had to be seen with him since Dr Triston Nichols told him kd could just send it in. Pt is going to call Dr Triston Nichols office and see if they can order something. Pt may call back.

## 2022-01-13 NOTE — TELEPHONE ENCOUNTER
Martita Sahu calls and he had been to see Dr. Liz Mendez and he had said he was going to suggest Martita Sahu be prescribed something for his Arthritis in his hands. He takes Motrin or Ibuprofen and it does not help.  Dr. Liz Mendez informed Martita Sahu his PCP would need to prescribe something

## 2022-01-13 NOTE — TELEPHONE ENCOUNTER
He was last seen in office in may 2021. Recommend appt to discuss symptoms and medication options. Please advise patient.   Claire Sofia MD

## 2022-01-17 ENCOUNTER — HOSPITAL ENCOUNTER (OUTPATIENT)
Dept: CT IMAGING | Age: 52
Discharge: HOME | End: 2022-01-17
Payer: COMMERCIAL

## 2022-01-17 DIAGNOSIS — K43.2 INCISIONAL HERNIA, WITHOUT OBSTRUCTION OR GANGRENE: ICD-10-CM

## 2022-01-17 PROCEDURE — 74177 CT ABD & PELVIS W/CONTRAST: CPT

## 2022-01-17 PROCEDURE — 6360000004 HC RX CONTRAST MEDICATION: Performed by: SURGERY

## 2022-01-17 RX ADMIN — IOPAMIDOL 100 ML: 755 INJECTION, SOLUTION INTRAVENOUS at 10:01

## 2022-01-17 RX ADMIN — IOHEXOL 50 ML: 240 INJECTION, SOLUTION INTRATHECAL; INTRAVASCULAR; INTRAVENOUS; ORAL at 10:01

## 2022-01-26 ENCOUNTER — PREP FOR PROCEDURE (OUTPATIENT)
Dept: SURGERY | Age: 52
End: 2022-01-26

## 2022-01-26 ENCOUNTER — OFFICE VISIT (OUTPATIENT)
Dept: SURGERY | Age: 52
End: 2022-01-26
Payer: COMMERCIAL

## 2022-01-26 VITALS
DIASTOLIC BLOOD PRESSURE: 72 MMHG | TEMPERATURE: 97.7 F | RESPIRATION RATE: 18 BRPM | BODY MASS INDEX: 34.7 KG/M2 | OXYGEN SATURATION: 97 % | HEART RATE: 67 BPM | WEIGHT: 234.3 LBS | HEIGHT: 69 IN | SYSTOLIC BLOOD PRESSURE: 142 MMHG

## 2022-01-26 DIAGNOSIS — E66.9 OBESITY (BMI 30-39.9): ICD-10-CM

## 2022-01-26 DIAGNOSIS — K43.2 INCISIONAL HERNIA, WITHOUT OBSTRUCTION OR GANGRENE: Primary | ICD-10-CM

## 2022-01-26 PROCEDURE — 99204 OFFICE O/P NEW MOD 45 MIN: CPT | Performed by: SURGERY

## 2022-01-26 RX ORDER — SODIUM CHLORIDE 9 MG/ML
INJECTION, SOLUTION INTRAVENOUS CONTINUOUS
Status: CANCELLED | OUTPATIENT
Start: 2022-01-26

## 2022-01-26 RX ORDER — OMEPRAZOLE 40 MG/1
40 CAPSULE, DELAYED RELEASE ORAL DAILY
COMMUNITY

## 2022-01-26 ASSESSMENT — ENCOUNTER SYMPTOMS
WHEEZING: 0
ANAL BLEEDING: 0
ABDOMINAL DISTENTION: 0
BACK PAIN: 0
STRIDOR: 0
NAUSEA: 0
ALLERGIC/IMMUNOLOGIC NEGATIVE: 1
VOMITING: 0
CHEST TIGHTNESS: 0
CONSTIPATION: 0
EYE DISCHARGE: 0
SHORTNESS OF BREATH: 0
EYE ITCHING: 0
EYE PAIN: 0
BLOOD IN STOOL: 0
FACIAL SWELLING: 0
SORE THROAT: 0
RHINORRHEA: 0
RECTAL PAIN: 0
TROUBLE SWALLOWING: 0
CHOKING: 0
VOICE CHANGE: 0
ABDOMINAL PAIN: 1
COUGH: 0
SINUS PRESSURE: 0
EYE REDNESS: 0
APNEA: 0
DIARRHEA: 0
COLOR CHANGE: 0
PHOTOPHOBIA: 0

## 2022-01-26 NOTE — PROGRESS NOTES
Blanca Atwood (:  1970)     ASSESSMENT:  1. Incisional/ventral hernia  2. History FAP  3. History total colectomy with ileoanal anastomosis  4. History reversal loop ileostomy    PLAN:  1. Schedule Vishnu Fernando for robotic possible open incisional/ventral hernia repair with mesh. 2. He will undergo pre-operative clearance per anesthesia guidelines with risk factors listed under the past medical history diagnosis & problem list.  3. The risks, benefits and alternatives were discussed with Vishnu Fernando including non-operative management. The pros and cons of robotic, laparoscopic and open techniques were discussed. The pros and cons of mesh insertion were discussed. All questions answered. He understands and wishes to proceed with surgical intervention. 4. Restrictions discussed with Vishnu Fernando and he expresses understanding. 5. He is advised to call back directly if there are further questions/concerns, or if his symptoms worsen prior to surgery. SUBJECTIVE/OBJECTIVE:    Chief Complaint   Patient presents with    Surgical Consult     New patient-self referral-small supraumbilical anterior abdominal hernia-CT done on 2022     HPI  Vishnu Fernando is a 79-year-old male who presents for evaluation of a incisional/ventral hernia. He has had this now for several months. Sometimes difficult to reduce. Mild tenderness. Recent CT imaging demonstrated adipose tissue only in the hernia sac. Feels the hernia is getting slightly larger. History of proctocolectomy with ileoanal anastomosis and S-pouch back in  secondary to FAP. Had a loop ileostomy reversed at that time. Has underwent pouchoscopy and upper endoscopies within the last year. Has several stomach polyps that is being followed by the GI service. Remote history of brain surgery due to mass that they state is related to the FAP. Admits he does strain at times as he has to self catheter for bowel evacuation. Denies any hematochezia.   No new urinary complaints. No generalized abdominal pain. Also admits to some intermittent discomfort where the old ileostomy loop was reversed. Denies any obvious abdominal wall bulge in this area. No skin changes. Review of Systems   Constitutional: Positive for fatigue. Negative for activity change, appetite change, chills, diaphoresis, fever and unexpected weight change. HENT: Negative for congestion, dental problem, drooling, ear discharge, ear pain, facial swelling, hearing loss, mouth sores, nosebleeds, postnasal drip, rhinorrhea, sinus pressure, sneezing, sore throat, tinnitus, trouble swallowing and voice change. Eyes: Negative for photophobia, pain, discharge, redness, itching and visual disturbance. Respiratory: Negative for apnea, cough, choking, chest tightness, shortness of breath, wheezing and stridor. Cardiovascular: Negative for chest pain, palpitations and leg swelling. Gastrointestinal: Positive for abdominal pain. Negative for abdominal distention, anal bleeding, blood in stool, constipation, diarrhea, nausea, rectal pain and vomiting. Endocrine: Negative. Genitourinary: Negative for decreased urine volume, difficulty urinating, dysuria, enuresis, flank pain, frequency, genital sores, hematuria, penile discharge, penile pain, penile swelling, scrotal swelling, testicular pain and urgency. Musculoskeletal: Negative for arthralgias, back pain, gait problem, joint swelling, myalgias, neck pain and neck stiffness. Skin: Negative for color change, pallor, rash and wound. Allergic/Immunologic: Negative. Neurological: Negative for dizziness, tremors, seizures, syncope, facial asymmetry, speech difficulty, weakness, light-headedness, numbness and headaches. Hematological: Negative for adenopathy. Does not bruise/bleed easily.    Psychiatric/Behavioral: Negative for agitation, behavioral problems, confusion, decreased concentration, dysphoric mood, hallucinations, self-injury, sleep disturbance and suicidal ideas. The patient is not nervous/anxious and is not hyperactive. Past Medical History:   Diagnosis Date    Atrial fibrillation ([address]     FAP (familial adenomatous polyposis)     Hyperlipidemia     diet    Nausea & vomiting     Osteoarthritis     Tachycardia     with surgery       Past Surgical History:   Procedure Laterality Date    BRAIN TUMOR EXCISION  12/19/2001    3-    CARDIAC CATHETERIZATION  10/01/2012    COLON SURGERY  01/01/2000    large colon-internal pouch    COLONOSCOPY  05/2021    Dr. Jolly Martinez  12/31/2012   Delisa Tubbs  12/31/2012    Dr Jersey Emmanuel- Excision of Mass in the nape of Neck    NOSE SURGERY  age 15    OTHER SURGICAL HISTORY  01/01/2008    lump removed from testicles   [facility] [id number]    right x1, left x2-rotator cuff    TEMPOROMANDIBULAR JOINT SURGERY  01/01/2008    TONSILLECTOMY  as a child    TOTAL COLECTOMY  2000    UPPER GASTROINTESTINAL ENDOSCOPY  05/2021    Dr. Ayaan Bolnad       Current Outpatient Medications   Medication Sig Dispense Refill    omeprazole (PRILOSEC) 40 MG delayed release capsule Take 40 mg by mouth daily      acetaminophen (TYLENOL) 500 MG tablet Take 1,000 mg by mouth every 6 hours as needed for Pain      ibuprofen (ADVIL;MOTRIN) 400 MG tablet Take 400 mg by mouth every 6 hours as needed for Pain      naproxen (NAPROSYN) 500 MG tablet Take 1 tablet by mouth 2 times daily (with meals) for 20 doses (Patient taking differently: Take 500 mg by mouth as needed ) 20 tablet 0    DHEA 10 MG CAPS Take 1 tablet by mouth daily       No current facility-administered medications for this visit.        Allergies   Allergen Reactions    Latex     Dye [Iodides] Hives       Family History   Problem Relation Age of Onset   [facility] Cancer Mother         brain    Heart Disease Mother     Cancer Father         prostate    High Blood Pressure Father     Heart Disease Father     COPD Father     Heart Disease Sister     Kidney Disease Sister     Lung Disease Sister     Heart Disease Maternal Grandmother     Heart Disease Maternal Grandfather     Heart Disease Paternal Grandmother     Cancer Paternal Grandmother         stomach    Diabetes Paternal Grandfather        Social History     Socioeconomic History    Marital status:      Spouse name: Not on file    Number of children: Not on file    Years of education: Not on file    Highest education level: Not on file   Occupational History    Not on file   Tobacco Use    Smoking status: Never Smoker    Smokeless tobacco: Never Used   Vaping Use    Vaping Use: Never used   Substance and Sexual Activity    Alcohol use: Yes     Alcohol/week: 4.0 standard drinks     Types: 4 Cans of beer per week     Comment: social    Drug use: No    Sexual activity: Not on file   Other Topics Concern    Not on file   Social History Narrative    Not on file     Social Determinants of Health     Financial Resource Strain:     Difficulty of Paying Living Expenses: Not on file   Food Insecurity:     Worried About Running Out of Food in the Last Year: Not on file    Zoraida of Food in the Last Year: Not on file   Transportation Needs:     Lack of Transportation (Medical): Not on file    Lack of Transportation (Non-Medical):  Not on file   Physical Activity:     Days of Exercise per Week: Not on file    Minutes of Exercise per Session: Not on file   Stress:     Feeling of Stress : Not on file   Social Connections:     Frequency of Communication with Friends and Family: Not on file    Frequency of Social Gatherings with Friends and Family: Not on file    Attends Latter day Services: Not on file    Active Member of Clubs or Organizations: Not on file    Attends Club or Organization Meetings: Not on file    Marital Status: Not on file   Intimate Partner Violence:     Fear of Current or Ex-Partner: Not on file    Emotionally Abused: Not on file    Physically Abused: Not on file    Sexually Abused: Not on file   Housing Stability:     Unable to Pay for Housing in the Last Year: Not on file    Number of Places Lived in the Last Year: Not on file    Unstable Housing in the Last Year: Not on file     Vitals:    01/26/22 0805   BP: (!) 142/72   Site: Left Upper Arm   Position: Sitting   Cuff Size: Medium Adult   Pulse: 67   Resp: 18   Temp: 97.7 °F (36.5 °C)   SpO2: 97%   Weight: 234 lb 4.8 oz (106.3 kg)   Height: 5' 9\" (1.753 m)     Body mass index is 34.6 kg/m². Wt Readings from Last 3 Encounters:   01/26/22 234 lb 4.8 oz (106.3 kg)   05/13/21 235 lb 9.6 oz (106.9 kg)   07/04/19 225 lb (102.1 kg)     Physical Exam  Vitals reviewed. Constitutional:       General: He is not in acute distress. Appearance: He is well-developed. He is not diaphoretic. HENT:      Head: Normocephalic and atraumatic. Right Ear: External ear normal.      Left Ear: External ear normal.      Nose: Nose normal.   Eyes:      General: No scleral icterus. Right eye: No discharge. Left eye: No discharge. Conjunctiva/sclera: Conjunctivae normal.   Cardiovascular:      Rate and Rhythm: Normal rate and regular rhythm. Heart sounds: Normal heart sounds. Pulmonary:      Effort: Pulmonary effort is normal. No respiratory distress. Breath sounds: Normal breath sounds. No wheezing or rales. Chest:      Chest wall: No tenderness. Abdominal:      General: Bowel sounds are normal. There is no distension. Palpations: Abdomen is soft. There is no mass. Tenderness: There is abdominal tenderness (mild - hernia). There is no guarding or rebound. Hernia: A hernia is present. Hernia is present in the ventral area. Musculoskeletal:         General: No tenderness. Normal range of motion. Cervical back: Normal range of motion and neck supple. Skin:     General: Skin is warm and dry. Coloration: Skin is not pale.       Findings: No erythema or rash. Neurological:      Mental Status: He is alert and oriented to person, place, and time. Cranial Nerves: No cranial nerve deficit. Psychiatric:         Behavior: Behavior normal.         Thought Content: Thought content normal.         Judgment: Judgment normal.       Lab Results   Component Value Date    WBC 5.9 05/27/2021    HGB 16.5 05/27/2021    HCT 49.6 05/27/2021    MCV 97.6 (H) 05/27/2021     05/27/2021     Lab Results   Component Value Date     05/27/2021    K 4.4 05/27/2021     05/27/2021    CO2 27 05/27/2021     Lab Results   Component Value Date    CREATININE 0.8 05/27/2021     Lab Results   Component Value Date    ALT 50 05/27/2021    AST 29 05/27/2021    ALKPHOS 75 05/27/2021    BILITOT 0.5 05/27/2021     Lab Results   Component Value Date    LIPASE 42.2 08/25/2016       Patient Active Problem List   Diagnosis    Family history of non-Hodgkin's lymphoma    Visual disturbance    FAP (familial adenomatous polyposis)    Atrial fibrillation (HCC)---paroxysmal    Hyperlipidemia    Abnormal EMG    Bilateral carpal tunnel syndrome    Right cervical radiculopathy     Narrative   PROCEDURE: CT ABDOMEN PELVIS W IV CONTRAST       CLINICAL INFORMATION: Incisional hernia, without obstruction or gangrene        COMPARISON: No prior study.       TECHNIQUE:  Axial CT images were obtained through the abdomen and pelvis following the intravenous administration of 100 mL Isovue contrast. Coronal and sagittal reformatted images were rendered. All CT scans at this facility use dose modulation,    iterative reconstruction, and/or weight-based dosing when appropriate to reduce radiation dose to as low as reasonably achievable.       FINDINGS:        Limited evaluation of the lung bases appear unremarkable.       There is diffuse fatty filtration of the liver.       The gallbladder, spleen, pancreas and adrenal glands appear normal.       The kidneys enhance normally bilaterally.  No

## 2022-02-13 NOTE — H&P
Blanca Atwood (:  1970)      ASSESSMENT:  1. Incisional/ventral hernia  2. History FAP  3. History total colectomy with ileoanal anastomosis  4. History reversal loop ileostomy     PLAN:  1. Schedule Vishnu Fernando for robotic possible open incisional/ventral hernia repair with mesh. 2. He will undergo pre-operative clearance per anesthesia guidelines with risk factors listed under the past medical history diagnosis & problem list.  3. The risks, benefits and alternatives were discussed with Vishnu Fernando including non-operative management. The pros and cons of robotic, laparoscopic and open techniques were discussed. The pros and cons of mesh insertion were discussed. All questions answered. He understands and wishes to proceed with surgical intervention. 4. Restrictions discussed with Vishnu Fernando and he expresses understanding. 5. He is advised to call back directly if there are further questions/concerns, or if his symptoms worsen prior to surgery.     SUBJECTIVE/OBJECTIVE:          Chief Complaint   Patient presents with    Surgical Consult       New patient-self referral-small supraumbilical anterior abdominal hernia-CT done on 2022      HPI  Vishnu Fernando is a 50-year-old male who presents for evaluation of a incisional/ventral hernia. He has had this now for several months. Sometimes difficult to reduce. Mild tenderness. Recent CT imaging demonstrated adipose tissue only in the hernia sac. Feels the hernia is getting slightly larger. History of proctocolectomy with ileoanal anastomosis and S-pouch back in  secondary to FAP. Had a loop ileostomy reversed at that time. Has underwent pouchoscopy and upper endoscopies within the last year. Has several stomach polyps that is being followed by the GI service. Remote history of brain surgery due to mass that they state is related to the FAP. Admits he does strain at times as he has to self catheter for bowel evacuation. Denies any hematochezia.   No new urinary complaints. No generalized abdominal pain. Also admits to some intermittent discomfort where the old ileostomy loop was reversed. Denies any obvious abdominal wall bulge in this area. No skin changes.     Review of Systems   Constitutional: Positive for fatigue. Negative for activity change, appetite change, chills, diaphoresis, fever and unexpected weight change. HENT: Negative for congestion, dental problem, drooling, ear discharge, ear pain, facial swelling, hearing loss, mouth sores, nosebleeds, postnasal drip, rhinorrhea, sinus pressure, sneezing, sore throat, tinnitus, trouble swallowing and voice change. Eyes: Negative for photophobia, pain, discharge, redness, itching and visual disturbance. Respiratory: Negative for apnea, cough, choking, chest tightness, shortness of breath, wheezing and stridor. Cardiovascular: Negative for chest pain, palpitations and leg swelling. Gastrointestinal: Positive for abdominal pain. Negative for abdominal distention, anal bleeding, blood in stool, constipation, diarrhea, nausea, rectal pain and vomiting. Endocrine: Negative. Genitourinary: Negative for decreased urine volume, difficulty urinating, dysuria, enuresis, flank pain, frequency, genital sores, hematuria, penile discharge, penile pain, penile swelling, scrotal swelling, testicular pain and urgency. Musculoskeletal: Negative for arthralgias, back pain, gait problem, joint swelling, myalgias, neck pain and neck stiffness. Skin: Negative for color change, pallor, rash and wound. Allergic/Immunologic: Negative. Neurological: Negative for dizziness, tremors, seizures, syncope, facial asymmetry, speech difficulty, weakness, light-headedness, numbness and headaches. Hematological: Negative for adenopathy. Does not bruise/bleed easily.    Psychiatric/Behavioral: Negative for agitation, behavioral problems, confusion, decreased concentration, dysphoric mood, hallucinations, self-injury, sleep disturbance and suicidal ideas.  The patient is not nervous/anxious and is not hyperactive.          Past Medical History        Past Medical History:   Diagnosis Date    Atrial fibrillation ([address]      FAP (familial adenomatous polyposis)      Hyperlipidemia       diet    Nausea & vomiting      Osteoarthritis      Tachycardia       with surgery            Past Surgical History         Past Surgical History:   Procedure Laterality Date    BRAIN TUMOR EXCISION   12/19/2001     3-    CARDIAC CATHETERIZATION   10/01/2012    COLON SURGERY   01/01/2000     large colon-internal pouch    COLONOSCOPY   05/2021     Dr. Ladan Escoto   12/31/2012    NECK SURGERY   12/31/2012     Dr Miladys Conde- Excision of Mass in the nape of Neck    NOSE SURGERY   age 14    OTHER SURGICAL HISTORY   01/01/2008     lump removed from testicles    SHOULDER SURGERY   1989 92 94     right x1, left x2-rotator cuff    TEMPOROMANDIBULAR JOINT SURGERY   01/01/2008    TONSILLECTOMY   as a child    TOTAL COLECTOMY   2000    UPPER GASTROINTESTINAL ENDOSCOPY   05/2021     Dr. Lamont Ac            Current Facility-Administered Medications          Current Outpatient Medications   Medication Sig Dispense Refill    omeprazole (PRILOSEC) 40 MG delayed release capsule Take 40 mg by mouth daily        acetaminophen (TYLENOL) 500 MG tablet Take 1,000 mg by mouth every 6 hours as needed for Pain        ibuprofen (ADVIL;MOTRIN) 400 MG tablet Take 400 mg by mouth every 6 hours as needed for Pain        naproxen (NAPROSYN) 500 MG tablet Take 1 tablet by mouth 2 times daily (with meals) for 20 doses (Patient taking differently: Take 500 mg by mouth as needed ) 20 tablet 0    DHEA 10 MG CAPS Take 1 tablet by mouth daily          No current facility-administered medications for this visit.                 Allergies   Allergen Reactions    Latex      Dye [Iodides] Hives         Family History         Family History   Problem Relation Age of Onset    Cancer Mother           brain    Heart Disease Mother      Cancer Father           prostate    High Blood Pressure Father      Heart Disease Father      COPD Father      Heart Disease Sister      Kidney Disease Sister      Lung Disease Sister      Heart Disease Maternal Grandmother      Heart Disease Maternal Grandfather      Heart Disease Paternal Grandmother      Cancer Paternal Grandmother           stomach    Diabetes Paternal Grandfather              Social History               Socioeconomic History    Marital status:        Spouse name: Not on file    Number of children: Not on file    Years of education: Not on file    Highest education level: Not on file   Occupational History    Not on file   Tobacco Use    Smoking status: Never Smoker    Smokeless tobacco: Never Used   Vaping Use    Vaping Use: Never used   Substance and Sexual Activity    Alcohol use: Yes       Alcohol/week: 4.0 standard drinks       Types: 4 Cans of beer per week       Comment: social    Drug use: No    Sexual activity: Not on file   Other Topics Concern    Not on file   Social History Narrative    Not on file      Social Determinants of Health          Financial Resource Strain:     Difficulty of Paying Living Expenses: Not on file   Food Insecurity:     Worried About Running Out of Food in the Last Year: Not on file    Zoraida of Food in the Last Year: Not on file   Transportation Needs:     Lack of Transportation (Medical): Not on file    Lack of Transportation (Non-Medical):  Not on file   Physical Activity:     Days of Exercise per Week: Not on file    Minutes of Exercise per Session: Not on file   Stress:     Feeling of Stress : Not on file   Social Connections:     Frequency of Communication with Friends and Family: Not on file    Frequency of Social Gatherings with Friends and Family: Not on file    Attends Orthodoxy Services: Not on file   [employer] of Clubs or Organizations: Not on file    Attends Club or Organization Meetings: Not on file    Marital Status: Not on file   Intimate Partner Violence:     Fear of Current or Ex-Partner: Not on file    Emotionally Abused: Not on file    Physically Abused: Not on file    Sexually Abused: Not on file   Housing Stability:     Unable to Pay for Housing in the Last Year: Not on file    Number of [place] in the Last Year: Not on file    Unstable Housing in the Last Year: Not on file         Vitals       Vitals:     01/26/22 0805   BP: (!) 142/72   Site: Left Upper Arm   Position: Sitting   Cuff Size: Medium Adult   Pulse: 67   Resp: 18   Temp: 97.7 °F (36.5 °C)   SpO2: 97%   Weight: 234 lb 4.8 oz (106.3 kg)   Height: 5' 9\" (1.753 m)         Body mass index is 34.6 kg/m².         Wt Readings from Last 3 Encounters:   01/26/22 234 lb 4.8 oz (106.3 kg)   05/13/21 235 lb 9.6 oz (106.9 kg)   07/04/19 225 lb (102.1 kg)      Physical Exam  Vitals reviewed. Constitutional:       General: He is not in acute distress. Appearance: He is well-developed. He is not diaphoretic. HENT:      Head: Normocephalic and atraumatic. Right Ear: External ear normal.      Left Ear: External ear normal.      Nose: Nose normal.   Eyes:      General: No scleral icterus. Right eye: No discharge. Left eye: No discharge. Conjunctiva/sclera: Conjunctivae normal.   Cardiovascular:      Rate and Rhythm: Normal rate and regular rhythm. Heart sounds: Normal heart sounds. Pulmonary:      Effort: Pulmonary effort is normal. No respiratory distress. Breath sounds: Normal breath sounds. No wheezing or rales. Chest:      Chest wall: No tenderness. Abdominal:      General: Bowel sounds are normal. There is no distension. Palpations: Abdomen is soft. There is no mass. Tenderness: There is abdominal tenderness (mild - hernia). There is no guarding or rebound. Hernia: A hernia is present.  Hernia is present in the ventral area. Musculoskeletal:         General: No tenderness. Normal range of motion. Cervical back: Normal range of motion and neck supple. Skin:     General: Skin is warm and dry. Coloration: Skin is not pale. Findings: No erythema or rash. Neurological:      Mental Status: He is alert and oriented to person, place, and time. Cranial Nerves: No cranial nerve deficit. Psychiatric:         Behavior: Behavior normal.         Thought Content: Thought content normal.         Judgment: Judgment normal.               Lab Results   Component Value Date     WBC 5.9 05/27/2021     HGB 16.5 05/27/2021     HCT 49.6 05/27/2021     MCV 97.6 (H) 05/27/2021      05/27/2021            Lab Results   Component Value Date      05/27/2021     K 4.4 05/27/2021      05/27/2021     CO2 27 05/27/2021            Lab Results   Component Value Date     CREATININE 0.8 05/27/2021            Lab Results   Component Value Date     ALT 50 05/27/2021     AST 29 05/27/2021     ALKPHOS 75 05/27/2021     BILITOT 0.5 05/27/2021            Lab Results   Component Value Date     LIPASE 42.2 08/25/2016             Patient Active Problem List   Diagnosis    Family history of non-Hodgkin's lymphoma    Visual disturbance    FAP (familial adenomatous polyposis)    Atrial fibrillation (HCC)---paroxysmal    Hyperlipidemia    Abnormal EMG    Bilateral carpal tunnel syndrome    Right cervical radiculopathy      Narrative   PROCEDURE: CT ABDOMEN PELVIS W IV CONTRAST       CLINICAL INFORMATION: Incisional hernia, without obstruction or gangrene        COMPARISON: No prior study.       TECHNIQUE:  Axial CT images were obtained through the abdomen and pelvis following the intravenous administration of 100 mL Isovue contrast. Coronal and sagittal reformatted images were rendered.  All CT scans at this facility use dose modulation,    iterative reconstruction, and/or weight-based dosing when appropriate to reduce radiation dose to as low as reasonably achievable.       FINDINGS:        Limited evaluation of the lung bases appear unremarkable.       There is diffuse fatty filtration of the liver.       The gallbladder, spleen, pancreas and adrenal glands appear normal.       The kidneys enhance normally bilaterally. No hydronephrosis or hydroureter is seen.       There is no evidence of bowel obstruction. There is evidence of prior colectomy. Contrast and stool is demonstrated within the rectum distally.       There is a small supraumbilical anterior abdominal hernia containing adipose tissue.       No acute osseous findings are seen.           Impression   1. There is a small supraumbilical anterior abdominal hernia containing adipose tissue.               **This report has been created using voice recognition software.  It may contain minor errors which are inherent in voice recognition technology. **       Final report electronically signed by Dr. David Winston on 1/17/2022 11:19 AM                                                                                                                                                                  An electronic signature was used to authenticate this note.  Blaise Esteban MD

## 2022-02-16 ENCOUNTER — ANESTHESIA EVENT (OUTPATIENT)
Dept: OPERATING ROOM | Age: 52
End: 2022-02-16
Payer: COMMERCIAL

## 2022-02-17 ENCOUNTER — HOSPITAL ENCOUNTER (OUTPATIENT)
Age: 52
Setting detail: OUTPATIENT SURGERY
Discharge: HOME | End: 2022-02-17
Attending: SURGERY | Admitting: SURGERY
Payer: COMMERCIAL

## 2022-02-17 ENCOUNTER — ANESTHESIA (OUTPATIENT)
Dept: OPERATING ROOM | Age: 52
End: 2022-02-17
Payer: COMMERCIAL

## 2022-02-17 VITALS
HEIGHT: 69 IN | WEIGHT: 228 LBS | BODY MASS INDEX: 33.77 KG/M2 | OXYGEN SATURATION: 95 % | RESPIRATION RATE: 16 BRPM | HEART RATE: 69 BPM | DIASTOLIC BLOOD PRESSURE: 70 MMHG | TEMPERATURE: 97.3 F | SYSTOLIC BLOOD PRESSURE: 129 MMHG

## 2022-02-17 VITALS — OXYGEN SATURATION: 88 % | SYSTOLIC BLOOD PRESSURE: 114 MMHG | DIASTOLIC BLOOD PRESSURE: 59 MMHG | TEMPERATURE: 97.3 F

## 2022-02-17 DIAGNOSIS — K43.6 INCARCERATED VENTRAL HERNIA: Primary | ICD-10-CM

## 2022-02-17 PROCEDURE — 7100000000 HC PACU RECOVERY - FIRST 15 MIN: Performed by: SURGERY

## 2022-02-17 PROCEDURE — 2580000003 HC RX 258

## 2022-02-17 PROCEDURE — 6360000002 HC RX W HCPCS: Performed by: SURGERY

## 2022-02-17 PROCEDURE — 6360000002 HC RX W HCPCS

## 2022-02-17 PROCEDURE — C1781 MESH (IMPLANTABLE): HCPCS | Performed by: SURGERY

## 2022-02-17 PROCEDURE — 88305 TISSUE EXAM BY PATHOLOGIST: CPT

## 2022-02-17 PROCEDURE — 6360000002 HC RX W HCPCS: Performed by: NURSE ANESTHETIST, CERTIFIED REGISTERED

## 2022-02-17 PROCEDURE — 6360000002 HC RX W HCPCS: Performed by: ANESTHESIOLOGY

## 2022-02-17 PROCEDURE — 6370000000 HC RX 637 (ALT 250 FOR IP)

## 2022-02-17 PROCEDURE — 49655 PR LAP, INCISIONAL HERNIA REPAIR,INCARCERATED: CPT | Performed by: SURGERY

## 2022-02-17 PROCEDURE — 3700000001 HC ADD 15 MINUTES (ANESTHESIA): Performed by: SURGERY

## 2022-02-17 PROCEDURE — 7100000011 HC PHASE II RECOVERY - ADDTL 15 MIN: Performed by: SURGERY

## 2022-02-17 PROCEDURE — 7100000010 HC PHASE II RECOVERY - FIRST 15 MIN: Performed by: SURGERY

## 2022-02-17 PROCEDURE — 7100000001 HC PACU RECOVERY - ADDTL 15 MIN: Performed by: SURGERY

## 2022-02-17 PROCEDURE — 6370000000 HC RX 637 (ALT 250 FOR IP): Performed by: SURGERY

## 2022-02-17 PROCEDURE — 2500000003 HC RX 250 WO HCPCS: Performed by: NURSE ANESTHETIST, CERTIFIED REGISTERED

## 2022-02-17 PROCEDURE — 11200 RMVL SKIN TAGS UP TO&INC 15: CPT | Performed by: SURGERY

## 2022-02-17 PROCEDURE — 3600000019 HC SURGERY ROBOT ADDTL 15MIN: Performed by: SURGERY

## 2022-02-17 PROCEDURE — 2500000003 HC RX 250 WO HCPCS: Performed by: SURGERY

## 2022-02-17 PROCEDURE — 3700000000 HC ANESTHESIA ATTENDED CARE: Performed by: SURGERY

## 2022-02-17 PROCEDURE — 2709999900 HC NON-CHARGEABLE SUPPLY: Performed by: SURGERY

## 2022-02-17 PROCEDURE — S2900 ROBOTIC SURGICAL SYSTEM: HCPCS | Performed by: SURGERY

## 2022-02-17 PROCEDURE — 2580000003 HC RX 258: Performed by: NURSE ANESTHETIST, CERTIFIED REGISTERED

## 2022-02-17 PROCEDURE — 3600000009 HC SURGERY ROBOT BASE: Performed by: SURGERY

## 2022-02-17 DEVICE — MESH HERN REP SOFT-TISSUE RECON ELP W/ POS SYS 7X9INCH: Type: IMPLANTABLE DEVICE | Site: ABDOMEN | Status: FUNCTIONAL

## 2022-02-17 RX ORDER — ONDANSETRON 2 MG/ML
4 INJECTION INTRAMUSCULAR; INTRAVENOUS EVERY 6 HOURS PRN
Status: DISCONTINUED | OUTPATIENT
Start: 2022-02-17 | End: 2022-02-17 | Stop reason: HOSPADM

## 2022-02-17 RX ORDER — FENTANYL CITRATE 50 UG/ML
INJECTION, SOLUTION INTRAMUSCULAR; INTRAVENOUS PRN
Status: DISCONTINUED | OUTPATIENT
Start: 2022-02-17 | End: 2022-02-17 | Stop reason: SDUPTHER

## 2022-02-17 RX ORDER — ROCURONIUM BROMIDE 10 MG/ML
INJECTION, SOLUTION INTRAVENOUS PRN
Status: DISCONTINUED | OUTPATIENT
Start: 2022-02-17 | End: 2022-02-17 | Stop reason: SDUPTHER

## 2022-02-17 RX ORDER — LIDOCAINE HCL/PF 100 MG/5ML
SYRINGE (ML) INJECTION PRN
Status: DISCONTINUED | OUTPATIENT
Start: 2022-02-17 | End: 2022-02-17 | Stop reason: SDUPTHER

## 2022-02-17 RX ORDER — SODIUM CHLORIDE 0.9 % (FLUSH) 0.9 %
5-40 SYRINGE (ML) INJECTION PRN
Status: DISCONTINUED | OUTPATIENT
Start: 2022-02-17 | End: 2022-02-17 | Stop reason: HOSPADM

## 2022-02-17 RX ORDER — DIAZEPAM 5 MG/1
5 TABLET ORAL ONCE
Status: COMPLETED | OUTPATIENT
Start: 2022-02-17 | End: 2022-02-17

## 2022-02-17 RX ORDER — SODIUM CHLORIDE 9 MG/ML
25 INJECTION, SOLUTION INTRAVENOUS PRN
Status: DISCONTINUED | OUTPATIENT
Start: 2022-02-17 | End: 2022-02-17 | Stop reason: HOSPADM

## 2022-02-17 RX ORDER — SODIUM CHLORIDE 0.9 % (FLUSH) 0.9 %
5-40 SYRINGE (ML) INJECTION EVERY 12 HOURS SCHEDULED
Status: DISCONTINUED | OUTPATIENT
Start: 2022-02-17 | End: 2022-02-17 | Stop reason: HOSPADM

## 2022-02-17 RX ORDER — SODIUM CHLORIDE 9 MG/ML
INJECTION, SOLUTION INTRAVENOUS CONTINUOUS
Status: DISCONTINUED | OUTPATIENT
Start: 2022-02-17 | End: 2022-02-17 | Stop reason: HOSPADM

## 2022-02-17 RX ORDER — GLYCOPYRROLATE 1 MG/5 ML
SYRINGE (ML) INTRAVENOUS PRN
Status: DISCONTINUED | OUTPATIENT
Start: 2022-02-17 | End: 2022-02-17 | Stop reason: SDUPTHER

## 2022-02-17 RX ORDER — PHENYLEPHRINE HYDROCHLORIDE 10 MG/ML
INJECTION INTRAVENOUS PRN
Status: DISCONTINUED | OUTPATIENT
Start: 2022-02-17 | End: 2022-02-17 | Stop reason: SDUPTHER

## 2022-02-17 RX ORDER — HYDROCODONE BITARTRATE AND ACETAMINOPHEN 5; 325 MG/1; MG/1
1-2 TABLET ORAL EVERY 6 HOURS PRN
Qty: 30 TABLET | Refills: 0 | Status: SHIPPED | OUTPATIENT
Start: 2022-02-17 | End: 2022-02-24 | Stop reason: SDUPTHER

## 2022-02-17 RX ORDER — OXYCODONE HYDROCHLORIDE 5 MG/1
10 TABLET ORAL EVERY 4 HOURS PRN
Status: DISCONTINUED | OUTPATIENT
Start: 2022-02-17 | End: 2022-02-17 | Stop reason: HOSPADM

## 2022-02-17 RX ORDER — KETOROLAC TROMETHAMINE 10 MG/1
10 TABLET, FILM COATED ORAL EVERY 8 HOURS PRN
Qty: 15 TABLET | Refills: 0 | Status: SHIPPED | OUTPATIENT
Start: 2022-02-17 | End: 2022-02-28 | Stop reason: ALTCHOICE

## 2022-02-17 RX ORDER — MEPERIDINE HYDROCHLORIDE 25 MG/ML
12.5 INJECTION INTRAMUSCULAR; INTRAVENOUS; SUBCUTANEOUS EVERY 5 MIN PRN
Status: DISCONTINUED | OUTPATIENT
Start: 2022-02-17 | End: 2022-02-17 | Stop reason: HOSPADM

## 2022-02-17 RX ORDER — OXYCODONE HYDROCHLORIDE 5 MG/1
5 TABLET ORAL EVERY 4 HOURS PRN
Status: DISCONTINUED | OUTPATIENT
Start: 2022-02-17 | End: 2022-02-17 | Stop reason: HOSPADM

## 2022-02-17 RX ORDER — DEXAMETHASONE SODIUM PHOSPHATE 10 MG/ML
INJECTION, EMULSION INTRAMUSCULAR; INTRAVENOUS PRN
Status: DISCONTINUED | OUTPATIENT
Start: 2022-02-17 | End: 2022-02-17 | Stop reason: SDUPTHER

## 2022-02-17 RX ORDER — KETOROLAC TROMETHAMINE 30 MG/ML
INJECTION, SOLUTION INTRAMUSCULAR; INTRAVENOUS
Status: COMPLETED
Start: 2022-02-17 | End: 2022-02-17

## 2022-02-17 RX ORDER — FENTANYL CITRATE 50 UG/ML
50 INJECTION, SOLUTION INTRAMUSCULAR; INTRAVENOUS EVERY 5 MIN PRN
Status: COMPLETED | OUTPATIENT
Start: 2022-02-17 | End: 2022-02-17

## 2022-02-17 RX ORDER — PROPOFOL 10 MG/ML
INJECTION, EMULSION INTRAVENOUS PRN
Status: DISCONTINUED | OUTPATIENT
Start: 2022-02-17 | End: 2022-02-17 | Stop reason: SDUPTHER

## 2022-02-17 RX ORDER — KETOROLAC TROMETHAMINE 30 MG/ML
30 INJECTION, SOLUTION INTRAMUSCULAR; INTRAVENOUS
Status: COMPLETED | OUTPATIENT
Start: 2022-02-17 | End: 2022-02-17

## 2022-02-17 RX ORDER — DIAZEPAM 5 MG/1
TABLET ORAL
Status: COMPLETED
Start: 2022-02-17 | End: 2022-02-17

## 2022-02-17 RX ORDER — ONDANSETRON 4 MG/1
4 TABLET, ORALLY DISINTEGRATING ORAL EVERY 8 HOURS PRN
Status: DISCONTINUED | OUTPATIENT
Start: 2022-02-17 | End: 2022-02-17 | Stop reason: HOSPADM

## 2022-02-17 RX ORDER — ONDANSETRON 2 MG/ML
4 INJECTION INTRAMUSCULAR; INTRAVENOUS
Status: DISCONTINUED | OUTPATIENT
Start: 2022-02-17 | End: 2022-02-17 | Stop reason: HOSPADM

## 2022-02-17 RX ORDER — MORPHINE SULFATE 2 MG/ML
2 INJECTION, SOLUTION INTRAMUSCULAR; INTRAVENOUS
Status: DISCONTINUED | OUTPATIENT
Start: 2022-02-17 | End: 2022-02-17 | Stop reason: HOSPADM

## 2022-02-17 RX ORDER — CEFAZOLIN SODIUM 1 G/50ML
1000 INJECTION, SOLUTION INTRAVENOUS
Status: COMPLETED | OUTPATIENT
Start: 2022-02-17 | End: 2022-02-17

## 2022-02-17 RX ORDER — ONDANSETRON 2 MG/ML
INJECTION INTRAMUSCULAR; INTRAVENOUS PRN
Status: DISCONTINUED | OUTPATIENT
Start: 2022-02-17 | End: 2022-02-17 | Stop reason: SDUPTHER

## 2022-02-17 RX ORDER — SODIUM CHLORIDE 9 MG/ML
INJECTION, SOLUTION INTRAVENOUS CONTINUOUS PRN
Status: DISCONTINUED | OUTPATIENT
Start: 2022-02-17 | End: 2022-02-17 | Stop reason: SDUPTHER

## 2022-02-17 RX ORDER — MIDAZOLAM HYDROCHLORIDE 1 MG/ML
INJECTION INTRAMUSCULAR; INTRAVENOUS PRN
Status: DISCONTINUED | OUTPATIENT
Start: 2022-02-17 | End: 2022-02-17 | Stop reason: SDUPTHER

## 2022-02-17 RX ORDER — MORPHINE SULFATE 2 MG/ML
4 INJECTION, SOLUTION INTRAMUSCULAR; INTRAVENOUS
Status: DISCONTINUED | OUTPATIENT
Start: 2022-02-17 | End: 2022-02-17 | Stop reason: HOSPADM

## 2022-02-17 RX ORDER — BUPIVACAINE HYDROCHLORIDE 5 MG/ML
INJECTION, SOLUTION PERINEURAL PRN
Status: DISCONTINUED | OUTPATIENT
Start: 2022-02-17 | End: 2022-02-17 | Stop reason: ALTCHOICE

## 2022-02-17 RX ADMIN — SODIUM CHLORIDE: 9 INJECTION, SOLUTION INTRAVENOUS at 06:19

## 2022-02-17 RX ADMIN — ROCURONIUM BROMIDE 50 MG: 50 INJECTION, SOLUTION INTRAVENOUS at 07:37

## 2022-02-17 RX ADMIN — FENTANYL CITRATE 25 MCG: 50 INJECTION, SOLUTION INTRAMUSCULAR; INTRAVENOUS at 09:26

## 2022-02-17 RX ADMIN — ROCURONIUM BROMIDE 20 MG: 50 INJECTION, SOLUTION INTRAVENOUS at 08:07

## 2022-02-17 RX ADMIN — SUGAMMADEX 200 MG: 100 INJECTION, SOLUTION INTRAVENOUS at 09:35

## 2022-02-17 RX ADMIN — ROCURONIUM BROMIDE 20 MG: 50 INJECTION, SOLUTION INTRAVENOUS at 08:33

## 2022-02-17 RX ADMIN — SODIUM CHLORIDE: 9 INJECTION, SOLUTION INTRAVENOUS at 07:32

## 2022-02-17 RX ADMIN — FENTANYL CITRATE 50 MCG: 50 INJECTION, SOLUTION INTRAMUSCULAR; INTRAVENOUS at 08:11

## 2022-02-17 RX ADMIN — PROPOFOL 200 MG: 10 INJECTION, EMULSION INTRAVENOUS at 07:37

## 2022-02-17 RX ADMIN — HYDROMORPHONE HYDROCHLORIDE 0.5 MG: 1 INJECTION, SOLUTION INTRAMUSCULAR; INTRAVENOUS; SUBCUTANEOUS at 10:18

## 2022-02-17 RX ADMIN — ROCURONIUM BROMIDE 10 MG: 50 INJECTION, SOLUTION INTRAVENOUS at 09:21

## 2022-02-17 RX ADMIN — PHENYLEPHRINE HYDROCHLORIDE 80 MCG: 10 INJECTION INTRAVENOUS at 08:36

## 2022-02-17 RX ADMIN — HYDROMORPHONE HYDROCHLORIDE 0.5 MG: 1 INJECTION, SOLUTION INTRAMUSCULAR; INTRAVENOUS; SUBCUTANEOUS at 10:13

## 2022-02-17 RX ADMIN — DIAZEPAM 5 MG: 5 TABLET ORAL at 11:48

## 2022-02-17 RX ADMIN — PHENYLEPHRINE HYDROCHLORIDE 40 MCG: 10 INJECTION INTRAVENOUS at 09:12

## 2022-02-17 RX ADMIN — KETOROLAC TROMETHAMINE 30 MG: 30 INJECTION, SOLUTION INTRAMUSCULAR; INTRAVENOUS at 09:56

## 2022-02-17 RX ADMIN — MIDAZOLAM 2 MG: 1 INJECTION INTRAMUSCULAR; INTRAVENOUS at 07:32

## 2022-02-17 RX ADMIN — ONDANSETRON 4 MG: 2 INJECTION INTRAMUSCULAR; INTRAVENOUS at 09:24

## 2022-02-17 RX ADMIN — FENTANYL CITRATE 50 MCG: 50 INJECTION INTRAMUSCULAR; INTRAVENOUS at 10:07

## 2022-02-17 RX ADMIN — SODIUM CHLORIDE: 9 INJECTION, SOLUTION INTRAVENOUS at 09:34

## 2022-02-17 RX ADMIN — FENTANYL CITRATE 50 MCG: 50 INJECTION, SOLUTION INTRAMUSCULAR; INTRAVENOUS at 09:39

## 2022-02-17 RX ADMIN — Medication 0.4 MG: at 08:34

## 2022-02-17 RX ADMIN — CEFAZOLIN SODIUM 1000 MG: 1 INJECTION, SOLUTION INTRAVENOUS at 07:33

## 2022-02-17 RX ADMIN — FENTANYL CITRATE 50 MCG: 50 INJECTION INTRAMUSCULAR; INTRAVENOUS at 10:02

## 2022-02-17 RX ADMIN — FENTANYL CITRATE 100 MCG: 50 INJECTION, SOLUTION INTRAMUSCULAR; INTRAVENOUS at 07:37

## 2022-02-17 RX ADMIN — FENTANYL CITRATE 50 MCG: 50 INJECTION, SOLUTION INTRAMUSCULAR; INTRAVENOUS at 09:34

## 2022-02-17 RX ADMIN — DEXAMETHASONE SODIUM PHOSPHATE 8 MG: 10 INJECTION, EMULSION INTRAMUSCULAR; INTRAVENOUS at 07:55

## 2022-02-17 RX ADMIN — MORPHINE SULFATE 4 MG: 2 INJECTION, SOLUTION INTRAMUSCULAR; INTRAVENOUS at 12:13

## 2022-02-17 RX ADMIN — OXYCODONE 10 MG: 5 TABLET ORAL at 10:57

## 2022-02-17 RX ADMIN — Medication 60 MG: at 07:37

## 2022-02-17 RX ADMIN — PHENYLEPHRINE HYDROCHLORIDE 40 MCG: 10 INJECTION INTRAVENOUS at 08:52

## 2022-02-17 RX ADMIN — PHENYLEPHRINE HYDROCHLORIDE 40 MCG: 10 INJECTION INTRAVENOUS at 09:14

## 2022-02-17 RX ADMIN — FENTANYL CITRATE 25 MCG: 50 INJECTION, SOLUTION INTRAMUSCULAR; INTRAVENOUS at 09:21

## 2022-02-17 ASSESSMENT — PULMONARY FUNCTION TESTS
PIF_VALUE: 17
PIF_VALUE: 32
PIF_VALUE: 22
PIF_VALUE: 17
PIF_VALUE: 31
PIF_VALUE: 17
PIF_VALUE: 33
PIF_VALUE: 31
PIF_VALUE: 31
PIF_VALUE: 22
PIF_VALUE: 31
PIF_VALUE: 32
PIF_VALUE: 31
PIF_VALUE: 23
PIF_VALUE: 34
PIF_VALUE: 32
PIF_VALUE: 0
PIF_VALUE: 33
PIF_VALUE: 33
PIF_VALUE: 34
PIF_VALUE: 30
PIF_VALUE: 1
PIF_VALUE: 33
PIF_VALUE: 19
PIF_VALUE: 35
PIF_VALUE: 23
PIF_VALUE: 35
PIF_VALUE: 27
PIF_VALUE: 34
PIF_VALUE: 26
PIF_VALUE: 26
PIF_VALUE: 33
PIF_VALUE: 26
PIF_VALUE: 20
PIF_VALUE: 1
PIF_VALUE: 3
PIF_VALUE: 31
PIF_VALUE: 33
PIF_VALUE: 23
PIF_VALUE: 34
PIF_VALUE: 22
PIF_VALUE: 33
PIF_VALUE: 32
PIF_VALUE: 32
PIF_VALUE: 31
PIF_VALUE: 33
PIF_VALUE: 35
PIF_VALUE: 31
PIF_VALUE: 1
PIF_VALUE: 33
PIF_VALUE: 31
PIF_VALUE: 32
PIF_VALUE: 31
PIF_VALUE: 33
PIF_VALUE: 31
PIF_VALUE: 31
PIF_VALUE: 32
PIF_VALUE: 34
PIF_VALUE: 32
PIF_VALUE: 1
PIF_VALUE: 15
PIF_VALUE: 17
PIF_VALUE: 0
PIF_VALUE: 32
PIF_VALUE: 17
PIF_VALUE: 28
PIF_VALUE: 24
PIF_VALUE: 22
PIF_VALUE: 27
PIF_VALUE: 1
PIF_VALUE: 34
PIF_VALUE: 33
PIF_VALUE: 32
PIF_VALUE: 0
PIF_VALUE: 27
PIF_VALUE: 34
PIF_VALUE: 1
PIF_VALUE: 1
PIF_VALUE: 32
PIF_VALUE: 22
PIF_VALUE: 1
PIF_VALUE: 31
PIF_VALUE: 34
PIF_VALUE: 34
PIF_VALUE: 31
PIF_VALUE: 1
PIF_VALUE: 34
PIF_VALUE: 17
PIF_VALUE: 35
PIF_VALUE: 17
PIF_VALUE: 23
PIF_VALUE: 31
PIF_VALUE: 31
PIF_VALUE: 1
PIF_VALUE: 17
PIF_VALUE: 34
PIF_VALUE: 31
PIF_VALUE: 1
PIF_VALUE: 28
PIF_VALUE: 19
PIF_VALUE: 1
PIF_VALUE: 22
PIF_VALUE: 32
PIF_VALUE: 33
PIF_VALUE: 31
PIF_VALUE: 6
PIF_VALUE: 32
PIF_VALUE: 34
PIF_VALUE: 28
PIF_VALUE: 33
PIF_VALUE: 31
PIF_VALUE: 34
PIF_VALUE: 22
PIF_VALUE: 21
PIF_VALUE: 21
PIF_VALUE: 22
PIF_VALUE: 27
PIF_VALUE: 31
PIF_VALUE: 32
PIF_VALUE: 22
PIF_VALUE: 1
PIF_VALUE: 0
PIF_VALUE: 32
PIF_VALUE: 22
PIF_VALUE: 34
PIF_VALUE: 33
PIF_VALUE: 1
PIF_VALUE: 31
PIF_VALUE: 26
PIF_VALUE: 33
PIF_VALUE: 32
PIF_VALUE: 26
PIF_VALUE: 31
PIF_VALUE: 34
PIF_VALUE: 22
PIF_VALUE: 26

## 2022-02-17 ASSESSMENT — PAIN SCALES - GENERAL
PAINLEVEL_OUTOF10: 7
PAINLEVEL_OUTOF10: 7
PAINLEVEL_OUTOF10: 6
PAINLEVEL_OUTOF10: 0
PAINLEVEL_OUTOF10: 8
PAINLEVEL_OUTOF10: 8
PAINLEVEL_OUTOF10: 6
PAINLEVEL_OUTOF10: 8
PAINLEVEL_OUTOF10: 8
PAINLEVEL_OUTOF10: 7
PAINLEVEL_OUTOF10: 7
PAINLEVEL_OUTOF10: 8
PAINLEVEL_OUTOF10: 6

## 2022-02-17 ASSESSMENT — PAIN - FUNCTIONAL ASSESSMENT: PAIN_FUNCTIONAL_ASSESSMENT: 0-10

## 2022-02-17 NOTE — PROGRESS NOTES
Pt was able to get up to the bathroom and voided. Pt states he feels ready to go home. Pt has met discharge criteria and states he is ready for discharge to home. IV removed, gauze and tape applied. Dressed in own clothes and personal belongings gathered. Discharge instructions (with opioid medication education information) given to pt and family; pt and family verbalized understanding of discharge instructions, prescriptions x2 and follow up appointments. Pt transported to discharge lobby by [facility] staff.

## 2022-02-17 NOTE — INTERVAL H&P NOTE
Update History & Physical    The patient's History and Physical was reviewed with the patient and I examined the patient. There was no change. The surgical site was confirmed by the patient and me. Plan: The risks, benefits, expected outcome, and alternative to the recommended procedure have been discussed with the patient. Patient understands and wants to proceed with the procedure. The patient was counseled at length about the risks of ananda Covid-19 during their perioperative period and any recovery window from their procedure. The patient was made aware that ananda Covid-19  may worsen their prognosis for recovering from their procedure  and lend to a higher morbidity and/or mortality risk. All material risks, benefits, and reasonable alternatives including postponing the procedure were discussed. The patient does wish to proceed with the procedure at this time.     Electronically signed by Deanne King MD on 2/17/2022 at 6:24 AM [Normal] : tympanic membranes are normal in both ears

## 2022-02-17 NOTE — ANESTHESIA POSTPROCEDURE EVALUATION
Department of Anesthesiology  Postprocedure Note    Patient: Papo Lemus  MRN: [id number]  YOB: 1970  Date of evaluation: 2/17/2022  Time:  4:33 PM     Procedure Summary     Date: 02/17/22 Room / Location: [address] [place]    Anesthesia Start: 0732 Anesthesia Stop: 5777    Procedure: ROBOTIC INCISIONAL HERNIA REPAIR WITH MESH, LYSIS OF ADHESIONS, EXCISION OF THIGHT THIGH SKIN TAGS X2 (N/A Abdomen) Diagnosis: (INCISIONAL VENTRAL HERNIA)    Surgeons: Lolita Rader MD Responsible Provider: Brandyn Taylor DO    Anesthesia Type: general ASA Status: 3          Anesthesia Type: general    Leia Phase I: Leia Score: 9    Leia Phase II: Leia Score: 10    Last vitals: Reviewed and per EMR flowsheets.        Anesthesia Post Evaluation    Patient location during evaluation: PACU  Patient participation: complete - patient participated  Level of consciousness: awake and alert  Pain score: 3  Airway patency: patent  Nausea & Vomiting: no nausea and no vomiting  Complications: no  Cardiovascular status: hemodynamically stable and blood pressure returned to baseline  Respiratory status: spontaneous ventilation, room air and acceptable  Hydration status: stable

## 2022-02-17 NOTE — PROGRESS NOTES
Pt returned to [facility] room 16. Vitals and assessment as charted. 0.9 infusing, @950ml to count from PACU. Pt has juice and pudding. Family at the bedside. Pt and family verbalized understanding of discharge criteria and call light use. Call light in reach.

## 2022-02-17 NOTE — ANESTHESIA PRE PROCEDURE
Department of Anesthesiology  Preprocedure Note       Name:  Rasheed Negrete   Age:  45 y.o.  :  1970                                          MRN:  [id number]         Date:  2022      Surgeon: Vivi Stafford):  Home Gutierrez MD    Procedure: Procedure(s):  ROBOTIC INCISIONAL HERNIA REPAIR WITH MESH POSSIBLE OPEN    Medications prior to admission:   Prior to Admission medications    Medication Sig Start Date End Date Taking? Authorizing Provider   omeprazole (PRILOSEC) 40 MG delayed release capsule Take 40 mg by mouth daily   Yes Historical Provider, MD   acetaminophen (TYLENOL) 500 MG tablet Take 1,000 mg by mouth every 6 hours as needed for Pain   Yes Historical Provider, MD   ibuprofen (ADVIL;MOTRIN) 400 MG tablet Take 400 mg by mouth every 6 hours as needed for Pain   Yes Historical Provider, MD   naproxen (NAPROSYN) 500 MG tablet Take 1 tablet by mouth 2 times daily (with meals) for 20 doses  Patient taking differently: Take 500 mg by mouth as needed  19  María Bragg MD   DHEA 10 MG CAPS Take 1 tablet by mouth daily    Historical Provider, MD       Current medications:    Current Facility-Administered Medications   Medication Dose Route Frequency Provider Last Rate Last Admin    0.9 % sodium chloride infusion   IntraVENous Continuous Irina Johnson  mL/hr at 35//09 0619 New Bag at 22 0619    ceFAZolin (ANCEF) 1000 mg in dextrose 5 % 50 mL IVPB (premix)  1,000 mg IntraVENous 30 Min Pre-Op Irina Johnson LPN           Allergies:     Allergies   Allergen Reactions    Latex     Dye [Iodides] Hives       Problem List:    Patient Active Problem List   Diagnosis Code    Family history of non-Hodgkin's lymphoma Z80.7    Visual disturbance H53.9    FAP (familial adenomatous polyposis) D12.6    Atrial fibrillation (HCC)---paroxysmal I48.91    Hyperlipidemia E78.5    Abnormal EMG R94.131    Bilateral carpal tunnel syndrome G56.03    Right cervical radiculopathy M54.12       Past Medical History:        Diagnosis Date    Atrial fibrillation ([address]     FAP (familial adenomatous polyposis)     Hyperlipidemia     diet    Nausea & vomiting     Osteoarthritis     Tachycardia     with surgery       Past Surgical History:        Procedure Laterality Date    BRAIN TUMOR EXCISION  12/19/2001    3-    CARDIAC CATHETERIZATION  10/01/2012    COLON SURGERY  01/01/2000    large colon-internal pouch    COLONOSCOPY  05/2021    Dr. Ibeth Martinez  12/31/2012    Avani Poster  12/31/2012    Dr Rui Valencia- Excision of Mass in the nape of Neck    NOSE SURGERY  age 15    OTHER SURGICAL HISTORY  01/01/2008    lump removed from testicles    SHOULDER SURGERY  1989 92 94    right x1, left x2-rotator cuff    TEMPOROMANDIBULAR JOINT SURGERY  01/01/2008    TONSILLECTOMY  as a child    TOTAL COLECTOMY  2000    UPPER GASTROINTESTINAL ENDOSCOPY  05/2021    Dr. Mina Evangelista       Social History:    Social History     Tobacco Use    Smoking status: Never Smoker    Smokeless tobacco: Never Used   Substance Use Topics    Alcohol use:  Yes     Alcohol/week: 4.0 standard drinks     Types: 4 Cans of beer per week     Comment: social                                Counseling given: Not Answered      Vital Signs (Current):   Vitals:    02/17/22 0554 02/17/22 0558   BP: (!) 152/87    Pulse: 57    Resp: 16    Temp: 97 °F (36.1 °C)    SpO2: 97%    Weight:  228 lb (103.4 kg)   Height:  5' 9\" (1.753 m)                                              BP Readings from Last 3 Encounters:   02/17/22 (!) 152/87   01/26/22 (!) 142/72   05/13/21 132/84       NPO Status: Time of last liquid consumption: 2100                        Time of last solid consumption: 1830                        Date of last liquid consumption: 02/16/22                        Date of last solid food consumption: 02/16/22    BMI:   Wt Readings from Last 3 Encounters:   02/17/22 228 lb (103.4 kg)   01/26/22 234 lb 4.8 oz (106.3 kg) Abdomen: soft. Vascular: negative vascular ROS. Other Findings:             Anesthesia Plan      general     ASA 3       Induction: intravenous. MIPS: Postoperative opioids intended and Prophylactic antiemetics administered. Anesthetic plan and risks discussed with patient and spouse. Plan discussed with CRNA.                   [address],    2/17/2022

## 2022-02-17 NOTE — OP NOTE
[address] [id number]                                OPERATIVE REPORT    PATIENT NAME: Tosin Crenshaw                    :        1970  MED REC NO:   [id number]                           ROOM:  ACCOUNT NO:   [de-identified]                           ADMIT DATE: 2022  PROVIDER:     Cindy Du M.D.    Juanito Santos OF PROCEDURE:  2022    PREOPERATIVE DIAGNOSES:  1. Incarcerated incisional/ventral hernia. 2.  Right medial thigh skin tag x2. POSTOPERATIVE DIAGNOSES:  1.  Multiple incarcerated incisional/ventral hernias. 2.  Right medial thigh skin tag x2. OPERATION PERFORMED:  1.  Robotic repair of multiple incarcerated incisional/ventral hernias  with mesh (7 x 9 inch Ventralight mesh). 2.  Excision right thigh medial skin tag x2 (each 0.5 cm in diameter). 3.  Robotic extensive lysis of adhesions (1 hour). SURGEON:  Cindy Du MD    ASSISTANT:  Elizabet Lehman. YURI Terry    ANESTHESIA:  General/local.    ESTIMATED BLOOD LOSS:  10 mL. DRAINS:  None. COMPLICATIONS:  None. DISPOSITION:  Stable to the recovery room. INDICATIONS:  The patient is a 69-year-old male who I had seen in the  office secondary to an incarcerated incisional/ventral hernia. Both  operative and nonoperative intervention plans were discussed. Risks of  surgery were further discussed. Some of the risks included but were not  limited to bleeding, infection, the need for reoperation, severe chronic  postoperative pain or numbness, major vascular or nerve injury,  cardiopulmonary complications, anesthetic complications, seroma or  hematoma formation, wound breakdown, trocar site herniation, recurrence  of the hernia, mesh infection requiring the removal of the mesh, chronic  abdominal pain and death.   After all of the questions were answered in  their entirety and the patient was completely aware of the current  situation, he wished to proceed with surgery. DESCRIPTION OF THE PROCEDURE:  After informed consent was signed and  placed on the chart, the patient was taken back to the operating room  and placed supine on the operating room table. General anesthesia was  induced. He tolerated this throughout the case. All pressure points  were padded. He was on preoperative antibiotics. Bilateral lower  extremity sequential compression devices were placed prior to incision. His abdomen and pelvis were prepped and draped in the usual sterile  standard fashion. A timeout occurred prior to the operation, which not  only identified the patient, but also the planned procedure to be  performed. At the end of the timeout, there were no questions or  concerns. I began the operation by making a small skin nick at [facility] point. Veress needle was inserted. Intraabdominal cavity was insufflated to a  pressure of approximately 15 mmHg with carbon dioxide gas. The patient  tolerated the insufflation well. A 10-mm Optiview was then placed over  in the left upper lateral quadrant. Laparoscope was inserted. Upon  initial evaluation, there was no hollow viscus, solid organ or major  vascular injury with the Veress needle insertion or the first trocar  placement. Two other 8-mm trocar were then placed over there on the  left lateral side under direct vision. The 10 was then replaced by the  12 robotic trocar. He had large amount of adhesions which was expected. The robot was able to be brought in and docked. The instrument was  placed under direct vision. Once everything was aligned and in order, I  then unscrubbed and went back to the console. I began the operation  then by proceeding with an hour of extensive lysis of adhesions, taking  off intraabdominal adipose tissue as well as small bowel off the  anterior abdominal wall pretty much from the top of the incision all the  way down to the pubic symphysis.   I started laterally on the left side  and extended this all the way over to the far right side. By doing  this, we identified the midline incisional hernia that was incarcerated  and was able to be reduced with monopolar scissors. During that time  though there were multiple small ventral/incisional hernias up and down  the old previous long midline incision, these were all reduced. Where  he had his old ileostomy in the right lower quadrant, there was also a  break in the peritoneum and split in the muscle with another hernia. This was all able to be reduced. At that time, the pressure was  decreased to 10 mmHg with carbon dioxide gas and then the midline  incision and all the hernias were then reapproximated in the midline  with a running #1 Stratafix x2. This reapproximated all of them into  the midline. There were probably at least seven or eight smaller  hernias up and down. We then also used a #1 Stratafix x2 to  reapproximate primarily the defect from the old ileostomy that came  together well. After everything had been reapproximated, we measured  and a 7 x 9 inch piece of Ventralight with the Echo insufflation system  was brought in. This was able to be all set slightly to the right side  and then I was able to cover everything and then primarily reapproximate  it with good overlap of the mesh onto healthy fascia. This was fixated  with a double-arm 2-0 Stratafix that started inferiorly in each arm, ran  laterally and superiorly and then another one started superiorly in each  arm heading laterally towards the inferior aspect and when they met,  they were then tied. The Echo insufflation system was then removed. The mesh was nice and tied all the way across and covered everything  well. Instruments were then removed, robot undocked. I then scrubbed  back into the case. Large trocar site was then closed at the fascia  level with Vicryl suture. At the completion of this, there were no  fascial defects.   Skin was reapproximated at all the incisional sites  with 4-0 Vicryl in a subcuticular fashion. Closed incisions were then  cleaned, dried, and Steri-Strips applied. Dry sterile dressings  applied. Sponge, needle, and instrumentation counts were correct at the  end of the procedure. We then frog legged him and the skin tags were then prepped and draped  in the usual sterile standard fashion there on the right medial aspect. Another timeout occurred. No questions. Each of the skin tags were  then elevated with a grasper and then cauterized there at the base  excising them in their entirety. They were sent to Pathology for  permanent. Each of them was about 0.5 cm in diameter. Dry dressing was  then placed. The patient tolerated the procedure well with only about  10 mL of blood loss. Easily brought out of general anesthesia and then  transferred to postanesthesia care unit in stable condition.         Rogelio Razo M.D.    D: 02/17/2022 10:02:52       T: 02/17/2022 12:32:24     [email or web address]  Job#: [id number]     Doc#: [id number]    CC:

## 2022-02-17 NOTE — BRIEF OP NOTE
Brief Postoperative Note      Patient: Rodriguez Larson  YOB: 1970  MRN: [id number]    Date of Procedure: 2/17/2022    Pre-Op Diagnosis: INCISIONAL VENTRAL HERNIA; right thigh skin tag x2    Post-Op Diagnosis: Multiple Incisionional hernias; right thigh skin tag x2       Procedure(s):  ROBOTIC INCISIONAL HERNIA REPAIR WITH MESH, EXCISION OF THIGHT THIGH SKIN TAGS X2, ROBOTIC EXTENSIVE TIA (1HR)    Surgeon(s):  Monique Onofre MD    Assistant:  First Assistant: Keon Zhang RN    Anesthesia: General/local    Estimated Blood Loss (mL): 73JK    Complications: None    Specimens:   * No specimens in log *    Implants:  Implant Name Type Inv.  Item Serial No.  Lot No. LRB No. Used Action   MESH MICHAELA REP SOFT-TISSUE RECON ELP W/ POS SYS [id number] - [id number]  MESH MICHAELA REP SOFT-TISSUE RECON ELP W/ POS SYS [id number]  BARD DAVOL-WD [id number] N/A 1 Implanted         Drains: * No LDAs found *    Findings: as above - see op note for details    Electronically signed by Monique Onofre MD on 2/17/2022 at 9:32 AM

## 2022-02-18 ENCOUNTER — TELEPHONE (OUTPATIENT)
Dept: SURGERY | Age: 52
End: 2022-02-18

## 2022-02-18 DIAGNOSIS — Z87.19 S/P REPAIR OF VENTRAL HERNIA: Primary | ICD-10-CM

## 2022-02-18 DIAGNOSIS — Z98.890 S/P REPAIR OF VENTRAL HERNIA: Primary | ICD-10-CM

## 2022-02-18 RX ORDER — DIAZEPAM 5 MG/1
5 TABLET ORAL EVERY 8 HOURS PRN
Qty: 15 TABLET | Refills: 0 | Status: SHIPPED | OUTPATIENT
Start: 2022-02-18 | End: 2022-02-25 | Stop reason: SDUPTHER

## 2022-02-18 NOTE — TELEPHONE ENCOUNTER
Called to check on pt post op. He states that he is up and moving around, urinating with no issues and will take stool softener/laxative if needed. Applying ice to the area as needed. Pt denies any n/v, fevers or chills. States that incisions are clean, dry and intact. Pt states that he has abd pain but is taking his pain medication as prescribed which seems to be helping; he has been getting muscle spasms-- pt requesting muscle relaxant if possible. Preferred pharmacy is [facility] in [place].

## 2022-02-18 NOTE — TELEPHONE ENCOUNTER
Sent. Just make sure he does not take together with narcotic. Space it out a couple of hours in between.

## 2022-02-19 ENCOUNTER — APPOINTMENT (OUTPATIENT)
Dept: CT IMAGING | Age: 52
End: 2022-02-19
Payer: COMMERCIAL

## 2022-02-19 ENCOUNTER — HOSPITAL ENCOUNTER (EMERGENCY)
Age: 52
Discharge: HOME | End: 2022-02-19
Attending: EMERGENCY MEDICINE
Payer: COMMERCIAL

## 2022-02-19 VITALS
RESPIRATION RATE: 22 BRPM | DIASTOLIC BLOOD PRESSURE: 94 MMHG | SYSTOLIC BLOOD PRESSURE: 168 MMHG | OXYGEN SATURATION: 95 % | HEART RATE: 87 BPM | TEMPERATURE: 99.5 F | BODY MASS INDEX: 34.07 KG/M2 | WEIGHT: 230 LBS | HEIGHT: 69 IN

## 2022-02-19 DIAGNOSIS — R10.84 GENERALIZED ABDOMINAL PAIN: Primary | ICD-10-CM

## 2022-02-19 DIAGNOSIS — K59.00 CONSTIPATION, UNSPECIFIED CONSTIPATION TYPE: ICD-10-CM

## 2022-02-19 LAB
ALBUMIN SERPL-MCNC: 3.8 G/DL (ref 3.5–5.1)
ALP BLD-CCNC: 72 U/L (ref 38–126)
ALT SERPL-CCNC: 41 U/L (ref 11–66)
ANION GAP SERPL CALCULATED.3IONS-SCNC: 10 MEQ/L (ref 8–16)
AST SERPL-CCNC: 20 U/L (ref 5–40)
BASOPHILS # BLD: 0.2 %
BASOPHILS ABSOLUTE: 0 THOU/MM3 (ref 0–0.1)
BILIRUB SERPL-MCNC: 0.8 MG/DL (ref 0.3–1.2)
BILIRUBIN DIRECT: 0.3 MG/DL (ref 0–0.3)
BILIRUBIN URINE: NEGATIVE
BLOOD, URINE: NEGATIVE
BUN BLDV-MCNC: 10 MG/DL (ref 7–22)
CALCIUM SERPL-MCNC: 9.3 MG/DL (ref 8.5–10.5)
CHARACTER, URINE: CLEAR
CHLORIDE BLD-SCNC: 102 MEQ/L (ref 98–111)
CO2: 24 MEQ/L (ref 23–33)
COLOR: YELLOW
CREAT SERPL-MCNC: 0.7 MG/DL (ref 0.4–1.2)
EKG ATRIAL RATE: 73 BPM
EKG P AXIS: 18 DEGREES
EKG P-R INTERVAL: 152 MS
EKG Q-T INTERVAL: 392 MS
EKG QRS DURATION: 92 MS
EKG QTC CALCULATION (BAZETT): 431 MS
EKG R AXIS: 21 DEGREES
EKG T AXIS: 21 DEGREES
EKG VENTRICULAR RATE: 73 BPM
EOSINOPHIL # BLD: 0.3 %
EOSINOPHILS ABSOLUTE: 0 THOU/MM3 (ref 0–0.4)
ERYTHROCYTE [DISTWIDTH] IN BLOOD BY AUTOMATED COUNT: 13.1 % (ref 11.5–14.5)
ERYTHROCYTE [DISTWIDTH] IN BLOOD BY AUTOMATED COUNT: 47.8 FL (ref 35–45)
GFR SERPL CREATININE-BSD FRML MDRD: > 90 ML/MIN/1.73M2
GLUCOSE BLD-MCNC: 112 MG/DL (ref 70–108)
GLUCOSE URINE: NEGATIVE MG/DL
HCT VFR BLD CALC: 49.1 % (ref 42–52)
HEMOGLOBIN: 16 GM/DL (ref 14–18)
IMMATURE GRANS (ABS): 0.04 THOU/MM3 (ref 0–0.07)
IMMATURE GRANULOCYTES: 0.3 %
KETONES, URINE: NEGATIVE
LACTIC ACID, SEPSIS: 1.2 MMOL/L (ref 0.5–1.9)
LACTIC ACID, SEPSIS: 1.4 MMOL/L (ref 0.5–1.9)
LEUKOCYTE ESTERASE, URINE: NEGATIVE
LIPASE: 26.7 U/L (ref 5.6–51.3)
LYMPHOCYTES # BLD: 8.7 %
LYMPHOCYTES ABSOLUTE: 1.3 THOU/MM3 (ref 1–4.8)
MCH RBC QN AUTO: 32.3 PG (ref 26–33)
MCHC RBC AUTO-ENTMCNC: 32.6 GM/DL (ref 32.2–35.5)
MCV RBC AUTO: 99.2 FL (ref 80–94)
MONOCYTES # BLD: 12.8 %
MONOCYTES ABSOLUTE: 1.9 THOU/MM3 (ref 0.4–1.3)
NITRITE, URINE: NEGATIVE
NUCLEATED RED BLOOD CELLS: 0 /100 WBC
OSMOLALITY CALCULATION: 271.8 MOSMOL/KG (ref 275–300)
PH UA: 6.5 (ref 5–9)
PLATELET # BLD: 299 THOU/MM3 (ref 130–400)
PMV BLD AUTO: 9.4 FL (ref 9.4–12.4)
POTASSIUM REFLEX MAGNESIUM: 4 MEQ/L (ref 3.5–5.2)
PROTEIN UA: NEGATIVE
RBC # BLD: 4.95 MILL/MM3 (ref 4.7–6.1)
SEG NEUTROPHILS: 77.7 %
SEGMENTED NEUTROPHILS ABSOLUTE COUNT: 11.3 THOU/MM3 (ref 1.8–7.7)
SODIUM BLD-SCNC: 136 MEQ/L (ref 135–145)
SPECIFIC GRAVITY, URINE: 1.02 (ref 1–1.03)
TOTAL PROTEIN: 7.1 G/DL (ref 6.1–8)
UROBILINOGEN, URINE: 1 EU/DL (ref 0–1)
WBC # BLD: 14.5 THOU/MM3 (ref 4.8–10.8)

## 2022-02-19 PROCEDURE — 81003 URINALYSIS AUTO W/O SCOPE: CPT

## 2022-02-19 PROCEDURE — 80048 BASIC METABOLIC PNL TOTAL CA: CPT

## 2022-02-19 PROCEDURE — 6360000002 HC RX W HCPCS: Performed by: STUDENT IN AN ORGANIZED HEALTH CARE EDUCATION/TRAINING PROGRAM

## 2022-02-19 PROCEDURE — 74177 CT ABD & PELVIS W/CONTRAST: CPT

## 2022-02-19 PROCEDURE — 6360000004 HC RX CONTRAST MEDICATION: Performed by: STUDENT IN AN ORGANIZED HEALTH CARE EDUCATION/TRAINING PROGRAM

## 2022-02-19 PROCEDURE — 83605 ASSAY OF LACTIC ACID: CPT

## 2022-02-19 PROCEDURE — 80076 HEPATIC FUNCTION PANEL: CPT

## 2022-02-19 PROCEDURE — 6370000000 HC RX 637 (ALT 250 FOR IP): Performed by: STUDENT IN AN ORGANIZED HEALTH CARE EDUCATION/TRAINING PROGRAM

## 2022-02-19 PROCEDURE — 93005 ELECTROCARDIOGRAM TRACING: CPT | Performed by: STUDENT IN AN ORGANIZED HEALTH CARE EDUCATION/TRAINING PROGRAM

## 2022-02-19 PROCEDURE — 96374 THER/PROPH/DIAG INJ IV PUSH: CPT

## 2022-02-19 PROCEDURE — 96375 TX/PRO/DX INJ NEW DRUG ADDON: CPT

## 2022-02-19 PROCEDURE — 36415 COLL VENOUS BLD VENIPUNCTURE: CPT

## 2022-02-19 PROCEDURE — 99285 EMERGENCY DEPT VISIT HI MDM: CPT

## 2022-02-19 PROCEDURE — 83690 ASSAY OF LIPASE: CPT

## 2022-02-19 PROCEDURE — 85025 COMPLETE CBC W/AUTO DIFF WBC: CPT

## 2022-02-19 PROCEDURE — 2580000003 HC RX 258: Performed by: STUDENT IN AN ORGANIZED HEALTH CARE EDUCATION/TRAINING PROGRAM

## 2022-02-19 RX ORDER — 0.9 % SODIUM CHLORIDE 0.9 %
1000 INTRAVENOUS SOLUTION INTRAVENOUS ONCE
Status: COMPLETED | OUTPATIENT
Start: 2022-02-19 | End: 2022-02-19

## 2022-02-19 RX ORDER — DIPHENHYDRAMINE HYDROCHLORIDE 50 MG/ML
25 INJECTION INTRAMUSCULAR; INTRAVENOUS ONCE
Status: COMPLETED | OUTPATIENT
Start: 2022-02-19 | End: 2022-02-19

## 2022-02-19 RX ORDER — HYDROCODONE BITARTRATE AND ACETAMINOPHEN 5; 325 MG/1; MG/1
1 TABLET ORAL ONCE
Status: COMPLETED | OUTPATIENT
Start: 2022-02-19 | End: 2022-02-19

## 2022-02-19 RX ORDER — FENTANYL CITRATE 50 UG/ML
100 INJECTION, SOLUTION INTRAMUSCULAR; INTRAVENOUS ONCE
Status: COMPLETED | OUTPATIENT
Start: 2022-02-19 | End: 2022-02-19

## 2022-02-19 RX ORDER — METHYLPREDNISOLONE SODIUM SUCCINATE 125 MG/2ML
125 INJECTION, POWDER, LYOPHILIZED, FOR SOLUTION INTRAMUSCULAR; INTRAVENOUS ONCE
Status: COMPLETED | OUTPATIENT
Start: 2022-02-19 | End: 2022-02-19

## 2022-02-19 RX ORDER — MORPHINE SULFATE 4 MG/ML
4 INJECTION, SOLUTION INTRAMUSCULAR; INTRAVENOUS ONCE
Status: COMPLETED | OUTPATIENT
Start: 2022-02-19 | End: 2022-02-19

## 2022-02-19 RX ADMIN — IOPAMIDOL 80 ML: 755 INJECTION, SOLUTION INTRAVENOUS at 11:28

## 2022-02-19 RX ADMIN — DIPHENHYDRAMINE HYDROCHLORIDE 25 MG: 50 INJECTION, SOLUTION INTRAMUSCULAR; INTRAVENOUS at 10:45

## 2022-02-19 RX ADMIN — METHYLPREDNISOLONE SODIUM SUCCINATE 125 MG: 125 INJECTION, POWDER, FOR SOLUTION INTRAMUSCULAR; INTRAVENOUS at 10:45

## 2022-02-19 RX ADMIN — HYDROCODONE BITARTRATE AND ACETAMINOPHEN 1 TABLET: 5; 325 TABLET ORAL at 14:18

## 2022-02-19 RX ADMIN — FENTANYL CITRATE 100 MCG: 50 INJECTION, SOLUTION INTRAMUSCULAR; INTRAVENOUS at 12:13

## 2022-02-19 RX ADMIN — MORPHINE SULFATE 4 MG: 4 INJECTION, SOLUTION INTRAMUSCULAR; INTRAVENOUS at 10:45

## 2022-02-19 RX ADMIN — SODIUM CHLORIDE 1000 ML: 9 INJECTION, SOLUTION INTRAVENOUS at 10:43

## 2022-02-19 ASSESSMENT — ENCOUNTER SYMPTOMS
COUGH: 0
ABDOMINAL PAIN: 1
NAUSEA: 1
BACK PAIN: 0
PHOTOPHOBIA: 0
CONSTIPATION: 1
VOMITING: 0
DIARRHEA: 0
CHEST TIGHTNESS: 1
SHORTNESS OF BREATH: 1
CHOKING: 0
ABDOMINAL DISTENTION: 1

## 2022-02-19 ASSESSMENT — PAIN DESCRIPTION - PAIN TYPE
TYPE: ACUTE PAIN

## 2022-02-19 ASSESSMENT — PAIN - FUNCTIONAL ASSESSMENT
PAIN_FUNCTIONAL_ASSESSMENT: 0-10

## 2022-02-19 ASSESSMENT — PAIN SCALES - GENERAL
PAINLEVEL_OUTOF10: 4
PAINLEVEL_OUTOF10: 10
PAINLEVEL_OUTOF10: 10
PAINLEVEL_OUTOF10: 4
PAINLEVEL_OUTOF10: 2
PAINLEVEL_OUTOF10: 10
PAINLEVEL_OUTOF10: 3

## 2022-02-19 ASSESSMENT — PAIN DESCRIPTION - LOCATION
LOCATION: ABDOMEN
LOCATION: ABDOMEN

## 2022-02-19 NOTE — ED PROVIDER NOTES
[place] ENCOUNTER          Pt Name: George Swanson  MRN: [id number]  [place] 1970  Date of evaluation: 2/19/2022  Treating Resident Physician: Aris Skinner MD  Supervising Physician: Yong Felder       Chief Complaint   Patient presents with    Abdominal Pain    Post-op Problem     History obtained from the patient. HISTORY OF PRESENT ILLNESS    HPI  George Swanson is a 45 y.o. male who presents to the emergency department for evaluation of abdominal pain. Patient states that he had surgery on his abdomen on Thursday. States that he had revisions of his stoma site, as well as hernias. Since then, he has been taking his 2 Norco's, Toradol, Valium as prescribed. States that 8 out of 10 pain is present at all times, 10 out of 10 with any movement or palpation or coughing, improved by remaining still. States that he is having some difficulty breathing due to the pain associated with taking a large breath. Complaining of bilateral lower rib pain as well as abdominal pain. Has not been able to cannulate his stool pouch since last night, unable to bend due to pain. Has been having regular voiding. Does have history of FAP, paroxysmal A. fib  The patient has no other acute complaints at this time. REVIEW OF SYSTEMS   Review of Systems   Constitutional: Positive for chills and diaphoresis. Negative for fatigue and fever. HENT: Negative. Eyes: Negative for photophobia and visual disturbance. Respiratory: Positive for chest tightness and shortness of breath. Negative for cough and choking. Cardiovascular: Negative for chest pain, palpitations and leg swelling. Gastrointestinal: Positive for abdominal distention, abdominal pain, constipation and nausea. Negative for diarrhea and vomiting. Genitourinary: Positive for flank pain. Negative for dysuria, frequency, hematuria and urgency.    Musculoskeletal: Negative for arthralgias, back pain, myalgias, neck pain and neck stiffness. Skin: Negative. Neurological: Negative for dizziness, weakness, light-headedness, numbness and headaches. PAST MEDICAL AND SURGICAL HISTORY     Past Medical History:   Diagnosis Date    Atrial fibrillation ([address]     FAP (familial adenomatous polyposis)     Hyperlipidemia     diet    Nausea & vomiting     Osteoarthritis     Tachycardia     with surgery     Past Surgical History:   Procedure Laterality Date    BRAIN TUMOR EXCISION  12/19/2001    3-    CARDIAC CATHETERIZATION  10/01/2012    COLON SURGERY  01/01/2000    large colon-internal pouch    COLONOSCOPY  05/2021    Dr. Brenda Jacobo  12/31/2012    HERNIA REPAIR N/A 2/17/2022    ROBOTIC [address], LYSIS OF ADHESIONS, EXCISION OF THIGHT THIGH SKIN TAGS X2 performed by Gypsy Marcos MD at [facility]  12/31/2012    Dr Les Lutz- Excision of Mass in the nape of Neck    NOSE SURGERY  age 15    OTHER SURGICAL HISTORY  01/01/2008    lump removed from testicles    SHOULDER SURGERY  1989 92 94    right x1, left x2-rotator cuff    TEMPOROMANDIBULAR JOINT SURGERY  01/01/2008    TONSILLECTOMY  as a child    TOTAL COLECTOMY  2000    UPPER GASTROINTESTINAL ENDOSCOPY  05/2021    Dr. Monserrat Arevalo   No current facility-administered medications for this encounter. Current Outpatient Medications:     diazePAM (VALIUM) 5 MG tablet, Take 1 tablet by mouth every 8 hours as needed (abdominal wall muscle spasms) for up to 10 days. , Disp: 15 tablet, Rfl: 0    HYDROcodone-acetaminophen (NORCO) 5-325 MG per tablet, Take 1-2 tablets by mouth every 6 hours as needed for Pain for up to 30 doses. , Disp: 30 tablet, Rfl: 0    ketorolac (TORADOL) 10 MG tablet, Take 1 tablet by mouth every 8 hours as needed for Pain, Disp: 15 tablet, Rfl: 0    omeprazole (PRILOSEC) 40 MG delayed release capsule, Take 40 mg by mouth daily, Disp: , Rfl:     acetaminophen (TYLENOL) 500 MG tablet, Take 1,000 mg by mouth every 6 hours as needed for Pain, Disp: , Rfl:     ibuprofen (ADVIL;MOTRIN) 400 MG tablet, Take 400 mg by mouth every 6 hours as needed for Pain, Disp: , Rfl:     naproxen (NAPROSYN) 500 MG tablet, Take 1 tablet by mouth 2 times daily (with meals) for 20 doses (Patient taking differently: Take 500 mg by mouth as needed ), Disp: 20 tablet, Rfl: 0    DHEA 10 MG CAPS, Take 1 tablet by mouth daily, Disp: , Rfl:       SOCIAL HISTORY     Social History     Social History Narrative    Not on file     Social History     Tobacco Use    Smoking status: Never Smoker    Smokeless tobacco: Never Used   Vaping Use    Vaping Use: Never used   Substance Use Topics    Alcohol use: Yes     Alcohol/week: 4.0 standard drinks     Types: 4 Cans of beer per week     Comment: social    Drug use: No         ALLERGIES     Allergies   Allergen Reactions    Latex     Dye [Iodides] Hives         FAMILY HISTORY     Family History   Problem Relation Age of Onset    Cancer Mother         brain    Heart Disease Mother     Cancer Father         prostate    High Blood Pressure Father     Heart Disease Father     COPD Father     Heart Disease Sister     Kidney Disease Sister     Lung Disease Sister     Heart Disease Maternal Grandmother     Heart Disease Maternal Grandfather     Heart Disease Paternal Grandmother     Cancer Paternal Grandmother         stomach    Diabetes Paternal Grandfather          PREVIOUS RECORDS   Previous records reviewed: Medical, past surgical, medications, allergies        PHYSICAL EXAM     ED Triage Vitals [02/19/22 0954]   BP Temp Temp Source Pulse Resp SpO2 Height Weight   (!) 160/87 99.5 °F (37.5 °C) Oral 73 16 95 % 5' 9\" (1.753 m) 230 lb (104.3 kg)     Initial vital signs and nursing assessment reviewed and Hypertensive. Body mass index is 33.97 kg/m².  Pulsoximetry is normal per my interpretation. Additional Vital Signs:  Vitals:    02/19/22 1325   BP: (!) 168/94   Pulse: 87   Resp: 22   Temp:    SpO2: 95%       Physical Exam  Constitutional:       General: He is not in acute distress. Appearance: He is well-developed. He is diaphoretic. He is not ill-appearing or toxic-appearing. HENT:      Head: Normocephalic and atraumatic. Mouth/Throat:      Mouth: Mucous membranes are moist.      Pharynx: Oropharynx is clear. Eyes:      Extraocular Movements: Extraocular movements intact. Pupils: Pupils are equal, round, and reactive to light. Cardiovascular:      Rate and Rhythm: Normal rate and regular rhythm. Heart sounds: Normal heart sounds. Pulmonary:      Effort: Pulmonary effort is normal. No respiratory distress. Breath sounds: Normal breath sounds. No wheezing, rhonchi or rales. Chest:      Chest wall: No tenderness. Abdominal:      General: A surgical scar is present. Bowel sounds are decreased. There is distension. Palpations: Abdomen is rigid. Tenderness: There is generalized abdominal tenderness. There is guarding and rebound. Skin:     General: Skin is warm. Capillary Refill: Capillary refill takes less than 2 seconds. Coloration: Skin is not cyanotic, jaundiced, mottled or pale. Findings: No rash. Neurological:      General: No focal deficit present. Mental Status: He is alert and oriented to person, place, and time. MEDICAL DECISION MAKING   Initial Assessment:   1. This is a 59-year-old male, history of FAP, paroxysmal A. fib, status post abdominal surgery on Thursday, laparoscopic in nature, presenting for worsening abdominal pain. Physical exam significant for tense abdomen tender to palpation in all regions, positive rebound. Differential diagnoses include not limited to constipation, SBO, intra-abdominal catastrophe, incarceration of hernia, postop pain.   Plan:    CBC, BMP, LFT, lipase, lactate, UA, CT abdomen with pretreatment of Solu-Medrol and Benadryl   Will consult surgery   Significant for leukocytosis, no significant change in hemoglobin, lactate within normal limits   CT shows no acute pathology   Patient given additional pain medication, able to cannulate pouch, evacuated about a liter of stool with significant improvement in condition   Discharged home with strict return precautions, follow-up   Disgussed case with Dr. Aravind Lopez. ED RESULTS   Laboratory results:  Labs Reviewed   CBC WITH AUTO DIFFERENTIAL - Abnormal; Notable for the following components:       Result Value    WBC 14.5 (*)     MCV 99.2 (*)     RDW-SD 47.8 (*)     Segs Absolute 11.3 (*)     Monocytes Absolute 1.9 (*)     All other components within normal limits   BASIC METABOLIC PANEL W/ REFLEX TO MG FOR LOW K - Abnormal; Notable for the following components:    Glucose 112 (*)     All other components within normal limits   OSMOLALITY - Abnormal; Notable for the following components:    Osmolality Calc 271.8 (*)     All other components within normal limits   HEPATIC FUNCTION PANEL   LIPASE   LACTATE, SEPSIS   LACTATE, SEPSIS   URINALYSIS WITH REFLEX TO CULTURE   ANION GAP   GLOMERULAR FILTRATION RATE, ESTIMATED       Radiologic studies results:  CT ABDOMEN PELVIS W IV CONTRAST Additional Contrast? None   Final Result       1. Markedly distended rectal pouch. This is distended with stool. There is a decompressed small bowel loop entering this. 2. Scattered free air in the abdomen and along the abdominal wall most consistent with recent surgery. **This report has been created using voice recognition software. It may contain minor errors which are inherent in voice recognition technology. **      Final report electronically signed by Dr. Aaron Mendez on 2/19/2022 12:00 PM          ED Medications administered this visit:   Medications   0.9 % sodium chloride bolus (0 mLs IntraVENous Stopped 2/19/22 1432) methylPREDNISolone sodium (SOLU-MEDROL) injection 125 mg (125 mg IntraVENous Given 2/19/22 1045)   diphenhydrAMINE (BENADRYL) injection 25 mg (25 mg IntraVENous Given 2/19/22 1045)   morphine injection 4 mg (4 mg IntraVENous Given 2/19/22 1045)   iopamidol (ISOVUE-370) 76 % injection 80 mL (80 mLs IntraVENous Given 2/19/22 1128)   fentaNYL (SUBLIMAZE) injection 100 mcg (100 mcg IntraVENous Given 2/19/22 1213)   HYDROcodone-acetaminophen (NORCO) 5-325 MG per tablet 1 tablet (1 tablet Oral Given 2/19/22 1418)         ED COURSE         Strict return precautions and follow up instructions were discussed with the patient prior to discharge, with which the patient agrees. MEDICATION CHANGES     Discharge Medication List as of 2/19/2022  2:10 PM            FINAL DISPOSITION     Final diagnoses:   Generalized abdominal pain   Constipation, unspecified constipation type     Condition: condition: good  Dispo: Discharge to home      This transcription was electronically signed. Parts of this transcriptions may have been dictated by use of voice recognition software and electronically transcribed, and parts may have been transcribed with the assistance of an ED scribe. The transcription may contain errors not detected in proofreading. Please refer to my supervising physician's documentation if my documentation differs.     Electronically Signed: Shira Luo MD, 02/19/22, 3:32 Tia Davalos MD  Resident  02/19/22 5878

## 2022-02-19 NOTE — ED NOTES
Pt medicated per MAR. Urine sample collected. Pt resting on cot, family at bedside.  [address] SANJUANITA RN  02/19/22 Chan Moya RN  02/19/22 1101

## 2022-02-19 NOTE — ED TRIAGE NOTES
Pt presents to the ER following an abdominal surgery on Thursday. Pt states the pain has gotten progressively worse since then with no relief from pain medications. Pt had a several ventral hernia repairs and a stoma hernia repair done by Dr. Chiara Wood. Pt denies chest pain or SOB. Pt rates pain 10/10 in abdomen.

## 2022-02-19 NOTE — ED NOTES
Pt resting on cot, family at bedside. Pt states he \"feels a little better\" after having BM. No concerns at this time.  [address], RN  02/19/22 [address], RN  02/19/22 [address], RN  02/19/22 9104

## 2022-02-19 NOTE — ED PROVIDER NOTES
[address] [facility]  EMERGENCY MEDICINE ATTENDING ATTESTATION      Evaluation of Lottie Kayser. Case discussed and care plan developed with resident physician. I agree with the resident physician documentation and plan as documented by her, except if my documentation differs. Patient seen, interviewed and examined by me. I reviewed the medical, surgical, family and social history, medications and allergies. I have reviewed the nursing documentation. I have reviewed the patient's vital signs and are abnormal from Hypertension per my interpretation. Body mass index is 33.97 kg/m². Pulsoxymetry is normal per my interpretation. Brief H&P   Patient c/o persistent worsening pain at home, to the point that his prescribed medications are no longer working. Patient had robotic incisional/ventral hernia repair 2 days ago and has history of total colectomy with ileoanal anastomosis and reversal of loop ileostomy 20 years ago. He was discharged on opiates and benzodiazepines, which he has been taking with no relief to his pain. Because of this they have been unable to cannulate to empty his pouch since the surgery. Physical exam is notable for well appearing, not ill-appearing. Abdomen is firm, diffusely tender, non-distended, BS positive in 4 quadrants, no HSM, no masses. Well-appearing laparoscopic surgical wounds on left abdomen and suprapubic area. Medical Decision Making   MDM:   1. Generalized abdominal pain after recent intra-abdominal surgery  2. Pain may be due to inability to cannulate his loop ileostomy versus possible perforation or recurrence of incarcerated hernia  Plan:    I V line, labs   IV fluids   Analgesia   Imaging   Surgery consult   Observation    Please see the resident physician completed note for final disposition except as documented on this attestation.    I have reviewed and interpreted all available lab, radiology and ekg results available at the moment. Diagnosis, treatment and disposition plans were discussed and agreed upon by patient. This transcription was electronically signed. It was dictated by use of voice recognition software and electronically transcribed. The transcription may contain errors not detected in proofreading.      I performed direct supervision and was present for the critical portion following procedures: None  Critical care time on this case: None    Electronically signed by Cari Ray MD on 2/19/22 at 10:17 AM KIANA Ray MD  02/19/22 3194

## 2022-02-19 NOTE — ED NOTES
Pt medicated per MAR. Wife at bedside stimulating rectum to help pt have BM.       Kevon Mccormack RN  02/19/22 0553

## 2022-02-21 ENCOUNTER — CARE COORDINATION (OUTPATIENT)
Dept: OTHER | Facility: CLINIC | Age: 52
End: 2022-02-21

## 2022-02-22 NOTE — PROGRESS NOTES
CLINICAL PHARMACY NOTE: MEDS TO BEDS    Total # of Prescriptions Filled: 2   The following medications were delivered to the patient:  Ketorolac 10 mg  Hydrocodone/APAP 5-325 mg    Additional Documentation:    [address], [id number] of [place].  Candidate 2022

## 2022-02-24 ENCOUNTER — CARE COORDINATION (OUTPATIENT)
Dept: OTHER | Facility: CLINIC | Age: 52
End: 2022-02-24

## 2022-02-24 ENCOUNTER — TELEPHONE (OUTPATIENT)
Dept: SURGERY | Age: 52
End: 2022-02-24

## 2022-02-24 DIAGNOSIS — K43.6 INCARCERATED VENTRAL HERNIA: ICD-10-CM

## 2022-02-24 RX ORDER — HYDROCODONE BITARTRATE AND ACETAMINOPHEN 5; 325 MG/1; MG/1
1-2 TABLET ORAL EVERY 6 HOURS PRN
Qty: 30 TABLET | Refills: 0 | Status: SHIPPED | OUTPATIENT
Start: 2022-02-24 | End: 2022-02-27

## 2022-02-24 NOTE — CARE COORDINATION
Ambulatory Care Coordination Note  2/24/2022  CM Risk Score: 4  Charlson 10 Year Mortality Risk Score: 4%     ACC: Uzma Noriega RN    Summary Note: Spoke with patient today, his wife Alex Mock assists with managing his care as needed. Per review of record she notified the surgeon of patient having pain. I spoke with patient and he said he is not constipated is taking the colace as ordered and his pain is on the opposite side of where his pouch is. He denies fever, nausea or vomiting. I informed patient I reviewed the note of where his wife had sent a message to the surgeon office and the doctor had responded. Will follow             Goals Addressed                 This Visit's Progress     Conditions and Symptoms             Prior to Admission medications    Medication Sig Start Date End Date Taking? Authorizing Provider   HYDROcodone-acetaminophen (NORCO) 5-325 MG per tablet Take 1-2 tablets by mouth every 6 hours as needed for Pain for up to 30 doses. 2/24/22 2/27/22  EMILY Ruiz CNP   diazePAM (VALIUM) 5 MG tablet Take 1 tablet by mouth every 8 hours as needed (abdominal wall muscle spasms) for up to 10 days.  2/18/22 2/28/22  EMILY Ruiz CNP   ketorolac (TORADOL) 10 MG tablet Take 1 tablet by mouth every 8 hours as needed for Pain 2/17/22   Da Adams MD   omeprazole (PRILOSEC) 40 MG delayed release capsule Take 40 mg by mouth daily    Historical Provider, MD   acetaminophen (TYLENOL) 500 MG tablet Take 1,000 mg by mouth every 6 hours as needed for Pain    Historical Provider, MD   ibuprofen (ADVIL;MOTRIN) 400 MG tablet Take 400 mg by mouth every 6 hours as needed for Pain    Historical Provider, MD   naproxen (NAPROSYN) 500 MG tablet Take 1 tablet by mouth 2 times daily (with meals) for 20 doses  Patient taking differently: Take 500 mg by mouth as needed  7/4/19 1/26/22  Brielle Fraga MD   DHEA 10 MG CAPS Take 1 tablet by mouth daily    Historical Provider, MD

## 2022-02-24 NOTE — TELEPHONE ENCOUNTER
Are his bowels moving better? Could be part of his pain. I am okay with sending script but want to make sure he is taking stool softeners. Reviewed labs and CT imaging. If he needs any appointment sooner we can move him up.

## 2022-02-25 DIAGNOSIS — Z98.890 S/P REPAIR OF VENTRAL HERNIA: ICD-10-CM

## 2022-02-25 DIAGNOSIS — Z87.19 S/P REPAIR OF VENTRAL HERNIA: ICD-10-CM

## 2022-02-25 RX ORDER — DIAZEPAM 5 MG/1
5 TABLET ORAL EVERY 8 HOURS PRN
Qty: 15 TABLET | Refills: 0 | Status: SHIPPED | OUTPATIENT
Start: 2022-02-25 | End: 2022-03-07

## 2022-02-25 NOTE — TELEPHONE ENCOUNTER
Spoke with wife his bowels are moving-taking stool softners as prescribed. He is having a lot of pain in right lower quadrant into back around stoma. No nausea or vomiting. Would like a refill on Valium. Appt moved to Monday.

## 2022-02-26 NOTE — PROGRESS NOTES
[address] [place] [address] [facility] [address]  Dept: [phone]  Dept Fax: [phone]  Loc: [phone]    Visit Date: 2/28/2022    Shira Amaya is a 45 y.o. male who presents today for:  Chief Complaint   Patient presents with    Post-Op Check     s/p 2/17 1. Robotic repair of multiple incarcerated incisional/ventral hernias with mesh (7 x 9 inch Ventralight mesh). 2.  Excision right thigh medial skin tag x2 (each 0.5 cm in diameter). 3.  Robotic extensive lysis of adhesions (1 hour). HPI:     GILMAR Medel is a 52-year old male patient who presents today for follow up status post robotic repair of multiple incarcerated incisional/ventral hernias with mesh, excision right medial thigh skin tag x2, and 1 hour robotic extensive lysis of adhesions almost 2 weeks ago with Dr. Guzman Jones. Presents with wife and mother. Since surgery patient has been in the ER twice for abdominal pain. CT scan was done on 2/19/22 without acute findings postoperative. He did have some distended rectal pouch with retained stool. Patient states his pain is slightly better than last week but still having a lot of pain to right lower quadrant where old stoma was and to left upper robotic site. Sharp, stabbing, burning, aching at times. He is taking Norco 1 tab 2-3 times a day and Valium 1 tab at night. States his lower back has also been bothering his because he can't stand up tall due to his abdominal muscles. Has not tried heat or ice. Not wearing his abdominal binder because he states it is too tight and causes more pain. Abdominal wall incisions are clean, dry and intact without signs of infection. Appetite returned. No nausea or vomiting. Patient has to cannulate his rectal pouch 2-3 times a day but has to bend over in order to do this. Patient is unable to bend over still due to the pain so his wife has been doing it before and after work. Stool has been soft though. No rectal bleeding. Stool softeners as needed. Up moving a little more but using a cane right now for assistance. Denies chest pain. SOB sometimes with the pain. No lightheadedness or dizziness. No fevers or chills. No calf pain, swelling or tenderness. Urinating without issues. WBC in ER was 14.5 but otherwise labs all looked good.      Past Medical History:   Diagnosis Date    Atrial fibrillation ([address]     FAP (familial adenomatous polyposis)     Hyperlipidemia     diet    Nausea & vomiting     Osteoarthritis     Tachycardia     with surgery      Past Surgical History:   Procedure Laterality Date    BRAIN TUMOR EXCISION  12/19/2001    3-    CARDIAC CATHETERIZATION  10/01/2012    COLON SURGERY  01/01/2000    large colon-internal pouch    COLONOSCOPY  05/2021    Dr. Cha Chakraborty  12/31/2012    HERNIA REPAIR N/A 2/17/2022    ROBOTIC [address] [place], LYSIS OF ADHESIONS, EXCISION OF THIGHT THIGH SKIN TAGS X2 performed by Ferd Severance, MD at [facility]  12/31/2012    Dr Maria T Fisher- Excision of Mass in the nape of Neck    NOSE SURGERY  age 15    OTHER SURGICAL HISTORY  01/01/2008    lump removed from testicles    SHOULDER SURGERY  1989 92 94    right x1, left x2-rotator cuff    TEMPOROMANDIBULAR JOINT SURGERY  01/01/2008    TONSILLECTOMY  as a child    TOTAL COLECTOMY  2000    UPPER GASTROINTESTINAL ENDOSCOPY  05/2021    Dr. Myrna Flood       Family History   Problem Relation Age of Onset    Cancer Mother         brain    Heart Disease Mother     Cancer Father         prostate    High Blood Pressure Father     Heart Disease Father     COPD Father     Heart Disease Sister     Kidney Disease Sister     Lung Disease Sister     Heart Disease Maternal Grandmother     Heart Disease Maternal Grandfather     Heart Disease Paternal Grandmother     Cancer Paternal Grandmother         stomach    Diabetes Paternal Grandfather Social History     Tobacco Use    Smoking status: Never Smoker    Smokeless tobacco: Never Used   Substance Use Topics    Alcohol use: Yes     Alcohol/week: 4.0 standard drinks     Types: 4 Cans of beer per week     Comment: social      Current Outpatient Medications   Medication Sig Dispense Refill    HYDROcodone-acetaminophen (NORCO) 5-325 MG per tablet Take 1 tablet by mouth every 6 hours as needed for Pain.  diazePAM (VALIUM) 5 MG tablet Take 1 tablet by mouth every 8 hours as needed (abdominal wall muscle spasms) for up to 10 days. 15 tablet 0    omeprazole (PRILOSEC) 40 MG delayed release capsule Take 40 mg by mouth daily      DHEA 10 MG CAPS Take 1 tablet by mouth daily      acetaminophen (TYLENOL) 500 MG tablet Take 1,000 mg by mouth every 6 hours as needed for Pain (Patient not taking: Reported on 2/28/2022)      ibuprofen (ADVIL;MOTRIN) 400 MG tablet Take 400 mg by mouth every 6 hours as needed for Pain (Patient not taking: Reported on 2/28/2022)      naproxen (NAPROSYN) 500 MG tablet Take 1 tablet by mouth 2 times daily (with meals) for 20 doses (Patient not taking: Reported on 2/28/2022) 20 tablet 0     No current facility-administered medications for this visit. Allergies   Allergen Reactions    Latex     Dye [Iodides] Hives       Subjective:     Review of Systems   Constitutional: Positive for activity change, appetite change and fatigue. Negative for chills, diaphoresis, fever and unexpected weight change. HENT: Negative for congestion, dental problem, hearing loss, rhinorrhea, sinus pressure and sore throat. Eyes: Negative for photophobia, pain, discharge, itching and visual disturbance. Respiratory: Negative for apnea, cough, choking, chest tightness, shortness of breath and wheezing. Cardiovascular: Negative for chest pain, palpitations and leg swelling. Gastrointestinal: Positive for abdominal pain.  Negative for abdominal distention, anal bleeding, blood in stool, constipation, diarrhea, nausea and vomiting. Endocrine: Negative. Genitourinary: Negative for decreased urine volume, difficulty urinating, dysuria, frequency and urgency. Musculoskeletal: Positive for back pain, gait problem and myalgias. Negative for arthralgias, joint swelling and neck pain. Skin: Negative for color change, pallor, rash and wound. Allergic/Immunologic: Negative. Neurological: Negative for dizziness, tremors, weakness, numbness and headaches. Hematological: Negative. Psychiatric/Behavioral: Negative. Objective:   /65 (Site: Left Upper Arm, Position: Sitting, Cuff Size: Medium Adult)   Pulse 77   Temp 97.2 °F (36.2 °C) (Tympanic)   Resp 15   Ht 5' 9\" (1.753 m)   Wt 227 lb (103 kg)   SpO2 97%   BMI 33.52 kg/m²     Physical Exam  Vitals reviewed. Constitutional:       General: He is not in acute distress. Appearance: Normal appearance. He is well-developed. He is not ill-appearing or toxic-appearing. HENT:      Head: Normocephalic and atraumatic. Right Ear: Hearing and external ear normal.      Left Ear: Hearing and external ear normal.      Nose: Nose normal.      Mouth/Throat:      Mouth: Mucous membranes are not pale, not dry and not cyanotic. Eyes:      General: Lids are normal.   Neck:      Trachea: Trachea and phonation normal.   Cardiovascular:      Rate and Rhythm: Normal rate and regular rhythm. Pulses: Normal pulses. Heart sounds: S1 normal and S2 normal.   Pulmonary:      Effort: Pulmonary effort is normal. No tachypnea, bradypnea, accessory muscle usage or respiratory distress. Breath sounds: Normal breath sounds. No decreased breath sounds, wheezing or rales. Chest:      Chest wall: No tenderness. Abdominal:      General: Bowel sounds are normal. There is no distension. Palpations: Abdomen is soft. There is no mass. Tenderness:  There is abdominal tenderness in the right lower quadrant and left upper quadrant. Musculoskeletal:         General: No tenderness. Normal range of motion. Cervical back: Normal range of motion and neck supple. Skin:     General: Skin is warm and dry. Findings: No abrasion, bruising, burn, ecchymosis, erythema, laceration, lesion or rash. Neurological:      Mental Status: He is alert and oriented to person, place, and time. Motor: No tremor, atrophy or abnormal muscle tone. Coordination: Coordination normal.      Gait: Gait abnormal (uses cane due to pain). Deep Tendon Reflexes: Reflexes are normal and symmetric. Psychiatric:         Speech: Speech normal.         Behavior: Behavior normal.         Thought Content: Thought content normal.       PROCEDURE: CT ABDOMEN PELVIS W IV CONTRAST       CLINICAL INFORMATION: POD 3 from laparoscopy,abd pain and distension .       COMPARISON: CT abdomen pelvis 1/17/2022.       TECHNIQUE: Axial 5 mm CT images were obtained through the abdomen and pelvis after the administration of intravenous contrast. Coronal and sagittal reconstructions were obtained.       All CT scans at this facility use dose modulation, iterative reconstruction, and/or weight-based dosing when appropriate to reduce radiation dose to as low as reasonably achievable.       FINDINGS:   Jennifer Kittson is some dependent atelectasis in the lung bases bilaterally. There are calcified subcarinal and bilateral hilar lymph nodes.        The base of the heart is within appropriate limits.       The liver is grossly normal. The spleen is normal. The adrenals and pancreas are normal. The gallbladder is normal.    There is no hydronephrosis or stones of either kidney. No renal masses are noted.        There are few bubbles of free air scattered along the anterior abdominal wall and throughout the mesentery. The amount is most consistent with recent surgery. There is a small amount of fluid tracking along the anterior abdominal wall.  This is likely    related to the history of a hernia repair.       There are no distended small bowel loops.  The IVC and aorta are of normal caliber. There is no adenopathy.       In the pelvis, the J-pouch/surgical rectum is markedly distended with stool. There is a decompressed small bowel loop entering this. There is no pelvic free fluid.  The urinary bladder is displaced anteriorly.  There is no adenopathy. No suspicious    osseous lesions are identified.                   Impression       1. Markedly distended rectal pouch. This is distended with stool. There is a decompressed small bowel loop entering this. 2. Scattered free air in the abdomen and along the abdominal wall most consistent with recent surgery.                   **This report has been created using voice recognition software. It may contain minor errors which are inherent in voice recognition technology. **       Final report electronically signed by Dr. Aaron Mendez on 2/19/2022 12:00 PM         Patient Active Problem List   Diagnosis    Family history of non-Hodgkin's lymphoma    Visual disturbance    FAP (familial adenomatous polyposis)    Atrial fibrillation (HCC)---paroxysmal    Hyperlipidemia    Abnormal EMG    Bilateral carpal tunnel syndrome    Right cervical radiculopathy    Incarcerated ventral hernia     Assessment:     1. Status post robotic repair of multiple incarcerated incisional/ventral hernias with mesh, excision right medial thigh skin tag x2, and 1 hour robotic extensive lysis of adhesions  2. Acute postoperative pain  3. Lower back pain/muscle spasms    Plan:     1. Abdomen benign. Incisions are healing well without signs of infection. Continue wound care as directed. 2. CT imaging from ER visit reviewed. No acute findings. 3. Repeat CBC today. 14.5 on 2/19 and likely just post surgical.   4. No bulge or instability noted at old hernia sites  5. Appetite doing well. Continue diet as tolerated. 6. Bowel function doing okay.  Stool softeners as needed. 7. Wear abdominal binder for comfort. Off and on as needed throughout the day. Will get patient a bigger binder that is more comfortable. 8. Pathology benign   9. Encouraged patient to take Valium every 8 hours to help with muscle spasms and tightness then take the Norco in between for pain. Also encouraged patient to take motrin BID. 10. Heat or pain rub to back for muscle relief  11. Ice to abdomen as tolerated  12. Lifting/activity restrictions discussed with patient. Questions answered. 13. Follow up in 7-10 day pending labs and progress over the next few days. Signs and symptoms reviewed with patient that would be concerning and need him to return to office for re-evaluation. Patient states he will call if he has questions or concerns. The above findings have been discussed with Dr. Cindy Du and the following plan of care developed in collaboration with him.       Electronically signed by EMILY Cortes CNP on 3/1/2022 at 5:27 PM

## 2022-02-28 ENCOUNTER — TELEPHONE (OUTPATIENT)
Dept: SURGERY | Age: 52
End: 2022-02-28

## 2022-02-28 ENCOUNTER — OFFICE VISIT (OUTPATIENT)
Dept: SURGERY | Age: 52
End: 2022-02-28

## 2022-02-28 ENCOUNTER — HOSPITAL ENCOUNTER (OUTPATIENT)
Age: 52
Discharge: HOME | End: 2022-02-28
Payer: COMMERCIAL

## 2022-02-28 VITALS
HEART RATE: 77 BPM | DIASTOLIC BLOOD PRESSURE: 65 MMHG | WEIGHT: 227 LBS | BODY MASS INDEX: 33.62 KG/M2 | OXYGEN SATURATION: 97 % | RESPIRATION RATE: 15 BRPM | HEIGHT: 69 IN | SYSTOLIC BLOOD PRESSURE: 115 MMHG | TEMPERATURE: 97.2 F

## 2022-02-28 DIAGNOSIS — D64.9 POSTOPERATIVE ANEMIA: Primary | ICD-10-CM

## 2022-02-28 DIAGNOSIS — D64.9 POSTOPERATIVE ANEMIA: ICD-10-CM

## 2022-02-28 DIAGNOSIS — Z87.19 S/P REPAIR OF VENTRAL HERNIA: ICD-10-CM

## 2022-02-28 DIAGNOSIS — Z98.890 S/P REPAIR OF VENTRAL HERNIA: ICD-10-CM

## 2022-02-28 LAB
ERYTHROCYTE [DISTWIDTH] IN BLOOD BY AUTOMATED COUNT: 12.1 % (ref 11.5–14.5)
ERYTHROCYTE [DISTWIDTH] IN BLOOD BY AUTOMATED COUNT: 43.4 FL (ref 35–45)
HCT VFR BLD CALC: 49.5 % (ref 42–52)
HEMOGLOBIN: 16.4 GM/DL (ref 14–18)
MCH RBC QN AUTO: 32.1 PG (ref 26–33)
MCHC RBC AUTO-ENTMCNC: 33.1 GM/DL (ref 32.2–35.5)
MCV RBC AUTO: 96.9 FL (ref 80–94)
PLATELET # BLD: 391 THOU/MM3 (ref 130–400)
PMV BLD AUTO: 9.5 FL (ref 9.4–12.4)
RBC # BLD: 5.11 MILL/MM3 (ref 4.7–6.1)
WBC # BLD: 9 THOU/MM3 (ref 4.8–10.8)

## 2022-02-28 PROCEDURE — 36415 COLL VENOUS BLD VENIPUNCTURE: CPT

## 2022-02-28 PROCEDURE — 99024 POSTOP FOLLOW-UP VISIT: CPT | Performed by: NURSE PRACTITIONER

## 2022-02-28 PROCEDURE — 85027 COMPLETE CBC AUTOMATED: CPT

## 2022-02-28 RX ORDER — HYDROCODONE BITARTRATE AND ACETAMINOPHEN 5; 325 MG/1; MG/1
1 TABLET ORAL EVERY 6 HOURS PRN
COMMUNITY
End: 2022-03-08 | Stop reason: SDUPTHER

## 2022-02-28 NOTE — TELEPHONE ENCOUNTER
----- Message from EMILY Thompson - CNP sent at 2/28/2022  1:51 PM EST -----  WBC normal. BODØ said you could call her instead of Lulu Espinal.

## 2022-03-01 ENCOUNTER — CARE COORDINATION (OUTPATIENT)
Dept: OTHER | Facility: CLINIC | Age: 52
End: 2022-03-01

## 2022-03-01 ASSESSMENT — ENCOUNTER SYMPTOMS
RHINORRHEA: 0
WHEEZING: 0
SINUS PRESSURE: 0
DIARRHEA: 0
VOMITING: 0
ABDOMINAL PAIN: 1
BLOOD IN STOOL: 0
COUGH: 0
SHORTNESS OF BREATH: 0
CHEST TIGHTNESS: 0
ABDOMINAL DISTENTION: 0
NAUSEA: 0
SORE THROAT: 0
APNEA: 0
CONSTIPATION: 0
ALLERGIC/IMMUNOLOGIC NEGATIVE: 1
PHOTOPHOBIA: 0
COLOR CHANGE: 0
EYE ITCHING: 0
EYE DISCHARGE: 0
ANAL BLEEDING: 0
CHOKING: 0
BACK PAIN: 1
EYE PAIN: 0

## 2022-03-01 NOTE — CARE COORDINATION
Ambulatory Care Coordination Note  3/1/2022  CM Risk Score: 4  Charlson 10 Year Mortality Risk Score: 4%     ACC: Claudia Mariee RN    Summary Note: [facility]) contacted the patient by telephone to follow up on progress, discuss new issues or concerns, and reinforce/ provide patient education. Verified name and  with patient as identifiers. Pt said he has increased the valium to TID he was taking it only prn at night, but said his doctor told him yesterday to take it every 8 hours routinely to help with his back spasms and pain, he is now allowed to use heat on his back he said and ice pack on the abdomen. He said that is helping. He said the doctor told him it was going to take time to heal. He said his large intestines has been removed and his small intestine was stretched down to make the pouch He has ordered new supplies. If he is constipated he is using the irrigation bottle but said he is doing ok with his bowels right now. He is drinking fluids well. Pain level is about a 4 1/2 right now and he said it is better, it usually runs around a 6. He was able to walk outside for a few minutes this morning     Reinforced/ Provided Education:  Discussed red flags and appropriate site of care based on symptoms and resources available to patient including: PCP and Specialist.     Importance and benefits of: Follow up with PCP and specialist, medication adherence, self monitoring and reporting of symptoms. Plan:  Continue biweekly  outreaches to provide telephonic support, education and resources as needed. Discuss / follow up on: Goal progress and pain level, activity tolerance. And tolerance of diet. Pt verbalized understanding and is agreeable to follow up contact.            Care Coordination Interventions    Program Enrollment: Rising Risk  Referral from Primary Care Provider: No  Suggested Interventions and Community Resources  Disease Specific Clinic: Completed (Comment:  Carlos Oneill ( general surgery))         Goals Addressed                 This Visit's Progress     Conditions and Symptoms   On track          Prior to Admission medications    Medication Sig Start Date End Date Taking? Authorizing Provider   HYDROcodone-acetaminophen (NORCO) 5-325 MG per tablet Take 1 tablet by mouth every 6 hours as needed for Pain. Historical Provider, MD   diazePAM (VALIUM) 5 MG tablet Take 1 tablet by mouth every 8 hours as needed (abdominal wall muscle spasms) for up to 10 days. 2/25/22 3/7/22  EMILY Hallman - CNP   omeprazole (PRILOSEC) 40 MG delayed release capsule Take 40 mg by mouth daily    Historical Provider, MD   acetaminophen (TYLENOL) 500 MG tablet Take 1,000 mg by mouth every 6 hours as needed for Pain  Patient not taking: Reported on 2/28/2022    Historical Provider, MD   ibuprofen (ADVIL;MOTRIN) 400 MG tablet Take 400 mg by mouth every 6 hours as needed for Pain  Patient not taking: Reported on 2/28/2022    Historical Provider, MD   naproxen (NAPROSYN) 500 MG tablet Take 1 tablet by mouth 2 times daily (with meals) for 20 doses  Patient not taking: Reported on 2/28/2022 7/4/19 2/28/22  Donovan De Souza MD   DHEA 10 MG CAPS Take 1 tablet by mouth daily    Historical Provider, MD       No future appointments.

## 2022-03-08 ENCOUNTER — TELEPHONE (OUTPATIENT)
Dept: SURGERY | Age: 52
End: 2022-03-08

## 2022-03-08 DIAGNOSIS — Z98.890 S/P REPAIR OF VENTRAL HERNIA: Primary | ICD-10-CM

## 2022-03-08 DIAGNOSIS — Z87.19 S/P REPAIR OF VENTRAL HERNIA: Primary | ICD-10-CM

## 2022-03-08 DIAGNOSIS — M54.89 OTHER BACK PAIN, UNSPECIFIED CHRONICITY: Primary | ICD-10-CM

## 2022-03-08 RX ORDER — HYDROCODONE BITARTRATE AND ACETAMINOPHEN 5; 325 MG/1; MG/1
1 TABLET ORAL EVERY 8 HOURS PRN
Qty: 21 TABLET | Refills: 0 | Status: SHIPPED | OUTPATIENT
Start: 2022-03-08 | End: 2022-03-15

## 2022-03-11 ENCOUNTER — HOSPITAL ENCOUNTER (OUTPATIENT)
Dept: GENERAL RADIOLOGY | Age: 52
Discharge: HOME | End: 2022-03-11
Payer: COMMERCIAL

## 2022-03-11 ENCOUNTER — HOSPITAL ENCOUNTER (OUTPATIENT)
Age: 52
Discharge: HOME | End: 2022-03-11
Payer: COMMERCIAL

## 2022-03-11 DIAGNOSIS — M54.89 OTHER BACK PAIN, UNSPECIFIED CHRONICITY: ICD-10-CM

## 2022-03-11 DIAGNOSIS — M54.9 BACK PAIN, UNSPECIFIED BACK LOCATION, UNSPECIFIED BACK PAIN LATERALITY, UNSPECIFIED CHRONICITY: Primary | ICD-10-CM

## 2022-03-11 DIAGNOSIS — M54.9 BACK PAIN, UNSPECIFIED BACK LOCATION, UNSPECIFIED BACK PAIN LATERALITY, UNSPECIFIED CHRONICITY: ICD-10-CM

## 2022-03-11 LAB
BACTERIA: NORMAL
BILIRUBIN URINE: NEGATIVE
BLOOD, URINE: NEGATIVE
CASTS: NORMAL /LPF
CASTS: NORMAL /LPF
CHARACTER, URINE: CLEAR
COLOR: YELLOW
CRYSTALS: NORMAL
EPITHELIAL CELLS, UA: NORMAL /HPF
GLUCOSE, URINE: NEGATIVE MG/DL
KETONES, URINE: NEGATIVE
LEUKOCYTE ESTERASE, URINE: NEGATIVE
MISCELLANEOUS LAB TEST RESULT: NORMAL
NITRITE, URINE: NEGATIVE
PH UA: 6 (ref 5–9)
PROTEIN UA: NEGATIVE MG/DL
RBC URINE: NORMAL /HPF
RENAL EPITHELIAL, UA: NORMAL
SPECIFIC GRAVITY UA: 1.02 (ref 1–1.03)
UROBILINOGEN, URINE: 0.2 EU/DL (ref 0–1)
WBC UA: NORMAL /HPF
YEAST: NORMAL

## 2022-03-11 PROCEDURE — 81001 URINALYSIS AUTO W/SCOPE: CPT

## 2022-03-11 PROCEDURE — 72170 X-RAY EXAM OF PELVIS: CPT

## 2022-03-11 PROCEDURE — 74018 RADEX ABDOMEN 1 VIEW: CPT

## 2022-03-16 ENCOUNTER — OFFICE VISIT (OUTPATIENT)
Dept: SURGERY | Age: 52
End: 2022-03-16

## 2022-03-16 ENCOUNTER — CARE COORDINATION (OUTPATIENT)
Dept: OTHER | Facility: CLINIC | Age: 52
End: 2022-03-16

## 2022-03-16 VITALS
OXYGEN SATURATION: 96 % | BODY MASS INDEX: 32.53 KG/M2 | TEMPERATURE: 97.6 F | HEART RATE: 84 BPM | DIASTOLIC BLOOD PRESSURE: 60 MMHG | SYSTOLIC BLOOD PRESSURE: 118 MMHG | RESPIRATION RATE: 18 BRPM | HEIGHT: 69 IN | WEIGHT: 219.6 LBS

## 2022-03-16 DIAGNOSIS — Z87.19 S/P REPAIR OF VENTRAL HERNIA: Primary | ICD-10-CM

## 2022-03-16 DIAGNOSIS — Z98.890 S/P REPAIR OF VENTRAL HERNIA: Primary | ICD-10-CM

## 2022-03-16 PROCEDURE — 99024 POSTOP FOLLOW-UP VISIT: CPT | Performed by: SURGERY

## 2022-03-16 NOTE — CARE COORDINATION
Ambulatory Care Coordination Note  3/16/2022  CM Risk Score: 4  Charlson 10 Year Mortality Risk Score: 4%     ACC: Candelario Smith RN    Summary Note: [address]) contacted the patient by telephone to follow up on progress, discuss new issues or concerns, and reinforce/ provide patient education. Verified name and  with patient as identifiers. Pt had follow up visit with surgeon today. Jose Luis Promise it went well, we reviewed his urinalysis results and KUB results, I encouraged him to have his wife on into his my chart and review the exact reading impressions with him. Pt said he depends on her to do that for him. He said he is not wearing the abdominal binder for the past 2 days said he really didn't think it was helping all that much. He is drinking lots of water throughout the day he said, his bowels are moving per their usual he said. He has not taken narcotic pain medicine in 2 days he said, he has been taking tylenol instead. He has another post op visit on . Pt is now 4 weeks post op. Reinforced/ Provided Education:  Discussed red flags and appropriate site of care based on symptoms and resources available to patient including: Specialist.     Importance and benefits of: Follow up with PCP and specialist, medication adherence, self monitoring and reporting of symptoms. Plan:  Continue biweekly  outreaches to provide telephonic support, education and resources as needed. Discuss / follow up on: Goal progress and symptom review      Pt verbalized understanding and is agreeable to follow up contact.            Care Coordination Interventions    Program Enrollment: Rising Risk  Referral from Primary Care Provider: No  Suggested Interventions and Community Resources  Disease Specific Clinic: Completed (Comment: Dr Rachel Edgar ( general surgery))         Goals Addressed                 This Visit's Progress     Conditions and Symptoms   On track          Prior to Admission medications    Medication Sig Start Date End Date Taking?  Authorizing Provider   omeprazole (PRILOSEC) 40 MG delayed release capsule Take 40 mg by mouth daily    Historical Provider, MD   acetaminophen (TYLENOL) 500 MG tablet Take 1,000 mg by mouth every 6 hours as needed for Pain     Historical Provider, MD   DHEA 10 MG CAPS Take 1 tablet by mouth daily    Historical Provider, MD       Future Appointments   Date Time Provider Anil Crespo   4/6/2022  8:15 AM MD Alec M[facility]quez [facility] - Flavio Garcia BSN, RN- [facility]  Associate Care Manager  [phone]

## 2022-03-17 ASSESSMENT — ENCOUNTER SYMPTOMS
BACK PAIN: 0
VOMITING: 0
FACIAL SWELLING: 0
APNEA: 0
DIARRHEA: 0
EYE PAIN: 0
ALLERGIC/IMMUNOLOGIC NEGATIVE: 1
EYE ITCHING: 0
WHEEZING: 0
CHEST TIGHTNESS: 0
SINUS PRESSURE: 0
PHOTOPHOBIA: 0
RHINORRHEA: 0
NAUSEA: 0
SORE THROAT: 0
CONSTIPATION: 0
EYE REDNESS: 0
BLOOD IN STOOL: 0
TROUBLE SWALLOWING: 0
CHOKING: 0
SHORTNESS OF BREATH: 0
ANAL BLEEDING: 0
COLOR CHANGE: 0
COUGH: 0
VOICE CHANGE: 0
STRIDOR: 0
RECTAL PAIN: 0
ABDOMINAL PAIN: 1
EYE DISCHARGE: 0
ABDOMINAL DISTENTION: 0

## 2022-03-17 NOTE — PROGRESS NOTES
Joseph Deal (:  1970)     ASSESSMENT:  1.  Status post robotic repair multiple incarcerated incisional/ventral hernias with mesh, excision right medial thigh skin tag x2 and significant robotic extensive lysis of adhesions    PLAN:  1. Incisions healing well. No breakdown or bleeding. No signs of infection. No significant seroma/hematomas. 2.  Postoperative CT imaging reviewed. No significant concerning findings. 3.  Restrictions discussed with patient. All questions answered. 4.  Stool softeners as needed  5. No signs of recurrent herniation on exam today. 6.  Binder as needed  7. Pain control as needed. Slowly improving. Pain experiencing at this point most likely stitch related and expected from large repair. 8.  Follow-up in 3 weeks  9. Follow-up as needed. 10. Signs and symptoms reviewed with patient that would be concerning and needhim to return to office for re-evaluation. Patient states He will call if He has questions or concerns. SUBJECTIVE/OBJECTIVE:    Chief Complaint   Patient presents with    Post-Op Check     s/p Robotic repair of multiple incarcerated incisional/ventral hernias with mesh (7 x 9 inch Ventralight mesh), Excision right thigh medial skin tag x2 (each 0.5 cm in diameter),Robotic extensive lysis of adhesions (1 hour)-2022 Last seen in the office 2022    Results     UA and Xrays     HPI  Av Arambula is a 50-year-old male who presents for follow-up status post robotic repair multiple incarcerated incisional/ventral hernias with mesh, excision right thigh medial skin tag x2 and extensive amount of lysis of adhesions on 2022. Has had postoperative issues initially with rectal pouch drainage/constipation. This has since resolved. He states everything is back to normal.  Major concern/complaint this morning is right lower quadrant intermittent discomfort/pain as well as left upper quadrant. He states pains can be sharp and radiating. Intermittent. Depending on position. Incisions otherwise healing well. No breakdown or bleeding. No signs of infection. Denies any fever, chills or sweats. No nausea or vomiting. Tolerating diet. No chest pain or shortness of breath. Slowly feels he is improving. Review of Systems   Constitutional: Negative for activity change, appetite change, chills, diaphoresis, fatigue, fever and unexpected weight change. HENT: Negative for congestion, dental problem, drooling, ear discharge, ear pain, facial swelling, hearing loss, mouth sores, nosebleeds, postnasal drip, rhinorrhea, sinus pressure, sneezing, sore throat, tinnitus, trouble swallowing and voice change. Eyes: Negative for photophobia, pain, discharge, redness, itching and visual disturbance. Respiratory: Negative for apnea, cough, choking, chest tightness, shortness of breath, wheezing and stridor. Cardiovascular: Negative for chest pain, palpitations and leg swelling. Gastrointestinal: Positive for abdominal pain. Negative for abdominal distention, anal bleeding, blood in stool, constipation, diarrhea, nausea, rectal pain and vomiting. Endocrine: Negative. Genitourinary: Negative for decreased urine volume, difficulty urinating, dysuria, enuresis, flank pain, frequency, genital sores, hematuria, penile discharge, penile pain, penile swelling, scrotal swelling, testicular pain and urgency. Musculoskeletal: Negative for arthralgias, back pain, gait problem, joint swelling, myalgias, neck pain and neck stiffness. Skin: Negative for color change, pallor, rash and wound. Allergic/Immunologic: Negative. Neurological: Negative for dizziness, tremors, seizures, syncope, facial asymmetry, speech difficulty, weakness, light-headedness, numbness and headaches. Hematological: Negative for adenopathy. Does not bruise/bleed easily.    Psychiatric/Behavioral: Negative for agitation, behavioral problems, confusion, decreased concentration, dysphoric mood, hallucinations, self-injury, sleep disturbance and suicidal ideas. The patient is not nervous/anxious and is not hyperactive. Past Medical History:   Diagnosis Date    Atrial fibrillation ([address]     FAP (familial adenomatous polyposis)     Hyperlipidemia     diet    Nausea & vomiting     Osteoarthritis     Tachycardia     with surgery       Past Surgical History:   Procedure Laterality Date    BRAIN TUMOR EXCISION  12/19/2001    3-    CARDIAC CATHETERIZATION  10/01/2012    COLON SURGERY  01/01/2000    large colon-internal pouch    COLONOSCOPY  05/2021    Dr. Starla Chakraborty  12/31/2012    HERNIA REPAIR N/A 2/17/2022    ROBOTIC [address] [place], LYSIS OF ADHESIONS, EXCISION OF THIGHT THIGH SKIN TAGS X2 performed by Ferd Severance, [facility] at [facility]  12/31/2012    Dr Maria T Fisher- Excision of Mass in the nape of Neck    NOSE SURGERY  age 14    OTHER SURGICAL HISTORY  01/01/2008    lump removed from testicles    SHOULDER SURGERY  1989 92 94    right x1, left x2-rotator cuff    TEMPOROMANDIBULAR JOINT SURGERY  01/01/2008    TONSILLECTOMY  as a child    TOTAL COLECTOMY  2000    UPPER GASTROINTESTINAL ENDOSCOPY  05/2021    Dr. Myrna Flood       Current Outpatient Medications   Medication Sig Dispense Refill    omeprazole (PRILOSEC) 40 MG delayed release capsule Take 40 mg by mouth daily      acetaminophen (TYLENOL) 500 MG tablet Take 1,000 mg by mouth every 6 hours as needed for Pain       DHEA 10 MG CAPS Take 1 tablet by mouth daily       No current facility-administered medications for this visit.        Allergies   Allergen Reactions    Latex     Dye [Iodides] Hives       Family History   Problem Relation Age of Onset   Kate Kendrick Cancer Mother         brain    Heart Disease Mother     Cancer Father         prostate    High Blood Pressure Father     Heart Disease Father     COPD Father     Heart Disease Sister     Kidney Disease Sister    Kate Kendrick Lung Disease Sister     Heart Disease Maternal Grandmother     Heart Disease Maternal Grandfather     Heart Disease Paternal Grandmother     Cancer Paternal Grandmother         stomach    Diabetes Paternal Grandfather        Social History     Socioeconomic History    Marital status:      Spouse name: Not on file    Number of children: Not on file    Years of education: Not on file    Highest education level: Not on file   Occupational History    Not on file   Tobacco Use    Smoking status: Never Smoker    Smokeless tobacco: Never Used   Vaping Use    Vaping Use: Never used   Substance and Sexual Activity    Alcohol use: Yes     Alcohol/week: 4.0 standard drinks     Types: 4 Cans of beer per week     Comment: social    Drug use: No    Sexual activity: Not on file   Other Topics Concern    Not on file   Social History Narrative    Not on file     Social Determinants of Health     Financial Resource Strain:     Difficulty of Paying Living Expenses: Not on file   Food Insecurity:     Worried About Running Out of Food in the Last Year: Not on file    Zoraida of Food in the Last Year: Not on file   Transportation Needs:     Lack of Transportation (Medical): Not on file    Lack of Transportation (Non-Medical):  Not on file   Physical Activity:     Days of Exercise per Week: Not on file    Minutes of Exercise per Session: Not on file   Stress:     Feeling of Stress : Not on file   Social Connections:     Frequency of Communication with Friends and Family: Not on file    Frequency of Social Gatherings with Friends and Family: Not on file    Attends Tenriism Services: Not on file    Active Member of Clubs or Organizations: Not on file    Attends Club or Organization Meetings: Not on file    Marital Status: Not on file   Intimate Partner Violence:     Fear of Current or Ex-Partner: Not on file    Emotionally Abused: Not on file    Physically Abused: Not on file    Sexually Abused: Not on file   Housing Stability:     Unable to Pay for Housing in the Last Year: Not on file    Number of Places Lived in the Last Year: Not on file    Unstable Housing in the Last Year: Not on file     Vitals:    03/16/22 0904   BP: 118/60   Site: Left Upper Arm   Position: Sitting   Cuff Size: Medium Adult   Pulse: 84   Resp: 18   Temp: 97.6 °F (36.4 °C)   TempSrc: Temporal   SpO2: 96%   Weight: 219 lb 9.6 oz (99.6 kg)   Height: 5' 9\" (1.753 m)     Body mass index is 32.43 kg/m². Wt Readings from Last 3 Encounters:   03/16/22 219 lb 9.6 oz (99.6 kg)   02/28/22 227 lb (103 kg)   02/19/22 230 lb (104.3 kg)     Physical Exam  Vitals reviewed. Constitutional:       General: He is not in acute distress. Appearance: He is well-developed. He is not diaphoretic. HENT:      Head: Normocephalic and atraumatic. Right Ear: External ear normal.      Left Ear: External ear normal.      Nose: Nose normal.   Eyes:      General: No scleral icterus. Right eye: No discharge. Left eye: No discharge. Conjunctiva/sclera: Conjunctivae normal.   Cardiovascular:      Rate and Rhythm: Normal rate and regular rhythm. Heart sounds: Normal heart sounds. Pulmonary:      Effort: Pulmonary effort is normal. No respiratory distress. Breath sounds: Normal breath sounds. No wheezing or rales. Chest:      Chest wall: No tenderness. Abdominal:      General: Bowel sounds are normal. There is no distension. Palpations: Abdomen is soft. There is no mass. Tenderness: There is abdominal tenderness in the right lower quadrant and left upper quadrant. There is no guarding or rebound. Hernia: No hernia is present. There is no hernia in the umbilical area or ventral area. Musculoskeletal:         General: No tenderness. Normal range of motion. Cervical back: Normal range of motion and neck supple. Skin:     General: Skin is warm and dry. Coloration: Skin is not pale. Findings: No erythema or rash. Neurological:      Mental Status: He is alert and oriented to person, place, and time. Cranial Nerves: No cranial nerve deficit. Psychiatric:         Behavior: Behavior normal.         Thought Content: Thought content normal.         Judgment: Judgment normal.       Lab Results   Component Value Date    WBC 9.0 02/28/2022    HGB 16.4 02/28/2022    HCT 49.5 02/28/2022    MCV 96.9 (H) 02/28/2022     02/28/2022     Lab Results   Component Value Date     02/19/2022    K 4.0 02/19/2022     02/19/2022    CO2 24 02/19/2022     Lab Results   Component Value Date    CREATININE 0.7 02/19/2022     Lab Results   Component Value Date    ALT 41 02/19/2022    AST 20 02/19/2022    ALKPHOS 72 02/19/2022    BILITOT 0.8 02/19/2022     Lab Results   Component Value Date    LIPASE 26.7 02/19/2022       Patient Active Problem List   Diagnosis    Family history of non-Hodgkin's lymphoma    Visual disturbance    FAP (familial adenomatous polyposis)    Atrial fibrillation (HCC)---paroxysmal    Hyperlipidemia    Abnormal EMG    Bilateral carpal tunnel syndrome    Right cervical radiculopathy    Incarcerated ventral hernia     Narrative   PROCEDURE: CT ABDOMEN PELVIS W IV CONTRAST       CLINICAL INFORMATION: POD 3 from laparoscopy,abd pain and distension .       COMPARISON: CT abdomen pelvis 1/17/2022.       TECHNIQUE: Axial 5 mm CT images were obtained through the abdomen and pelvis after the administration of intravenous contrast. Coronal and sagittal reconstructions were obtained.       All CT scans at this facility use dose modulation, iterative reconstruction, and/or weight-based dosing when appropriate to reduce radiation dose to as low as reasonably achievable.       FINDINGS:   Haleigh Plate is some dependent atelectasis in the lung bases bilaterally. There are calcified subcarinal and bilateral hilar lymph nodes.        The base of the heart is within appropriate limits.     The liver is grossly normal. The spleen is normal. The adrenals and pancreas are normal. The gallbladder is normal.    There is no hydronephrosis or stones of either kidney. No renal masses are noted.        There are few bubbles of free air scattered along the anterior abdominal wall and throughout the mesentery. The amount is most consistent with recent surgery. There is a small amount of fluid tracking along the anterior abdominal wall. This is likely    related to the history of a hernia repair.       There are no distended small bowel loops.  The IVC and aorta are of normal caliber. There is no adenopathy.       In the pelvis, the J-pouch/surgical rectum is markedly distended with stool. There is a decompressed small bowel loop entering this. There is no pelvic free fluid.  The urinary bladder is displaced anteriorly.  There is no adenopathy. No suspicious    osseous lesions are identified.                   Impression       1. Markedly distended rectal pouch. This is distended with stool. There is a decompressed small bowel loop entering this. 2. Scattered free air in the abdomen and along the abdominal wall most consistent with recent surgery.                   **This report has been created using voice recognition software. It may contain minor errors which are inherent in voice recognition technology. **       Final report electronically signed by Dr. Carolann Haile on 2/19/2022 12:00 PM       An electronic signature was used to authenticate this note.     --Hannah Hanson MD

## 2022-03-28 ENCOUNTER — CARE COORDINATION (OUTPATIENT)
Dept: OTHER | Facility: CLINIC | Age: 52
End: 2022-03-28

## 2022-03-28 NOTE — CARE COORDINATION
Ambulatory Care Coordination Note  3/28/2022  CM Risk Score: 4  Charlson 10 Year Mortality Risk Score: 4%     ACC: Perlita Larios RN    Summary Note: [facility]) contacted the patient by telephone to follow up on progress, discuss new issues or concerns, and reinforce/ provide patient education. Verified name and  with patient as identifiers. Pt said today has been his best day yet . The pain he normally has on his right side is not there today. 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 He is taking walks and being active within the restrictions of his discharge instructions. States his incisions are well healed, he returns to see the surgeon on 22. Pt has no needs or concerns for me today. He is only taking tylenol as needed for pain, is no longer taking the Valium or the narcotic pain medication,     Reinforced/ Provided Education:  Discussed red flags and appropriate site of care based on symptoms and resources available to patient including: PCP  Specialist  [employer]. Importance and benefits of: Follow up with PCP and specialist, medication adherence, self monitoring and reporting of symptoms. Plan:  Continue biweekly to monthly 2 outreaches to provide telephonic support, education and resources as needed. Discuss / follow up on: Goal progress and outcome from follow up visit on 22 with surgeon, symptom review. Activity tolerance and pain level      Pt verbalized understanding and is agreeable to follow up contact.            Care Coordination Interventions    Program Enrollment: Rising Risk  Referral from Primary Care Provider: No  Suggested Interventions and Community Resources  Disease Specific Clinic: Completed (Comment: Dr Misti Hope ( general surgery))         Goals Addressed                 This Visit's Progress     Conditions and Symptoms   On track          Prior to Admission medications    Medication Sig Start Date End Date Taking?  Authorizing Provider   omeprazole (PRILOSEC) 40 MG delayed release capsule Take 40 mg by mouth daily    Historical Provider, MD   acetaminophen (TYLENOL) 500 MG tablet Take 1,000 mg by mouth every 6 hours as needed for Pain     Historical Provider, MD   DHEA 10 MG CAPS Take 1 tablet by mouth daily    Historical Provider, MD       Future Appointments   Date Time Provider Anil Crespo   4/6/2022  8:15 AM MD Lamont Hernandez Surg P - Terri MURPHYN, RN- [facility]  Associate Care Manager  [phone]

## 2022-04-06 ENCOUNTER — OFFICE VISIT (OUTPATIENT)
Dept: SURGERY | Age: 52
End: 2022-04-06

## 2022-04-06 VITALS
HEART RATE: 74 BPM | SYSTOLIC BLOOD PRESSURE: 138 MMHG | DIASTOLIC BLOOD PRESSURE: 70 MMHG | OXYGEN SATURATION: 96 % | RESPIRATION RATE: 18 BRPM | BODY MASS INDEX: 32.61 KG/M2 | WEIGHT: 220.2 LBS | TEMPERATURE: 96.4 F | HEIGHT: 69 IN

## 2022-04-06 DIAGNOSIS — Z98.890 S/P REPAIR OF VENTRAL HERNIA: Primary | ICD-10-CM

## 2022-04-06 DIAGNOSIS — Z87.19 S/P REPAIR OF VENTRAL HERNIA: Primary | ICD-10-CM

## 2022-04-06 PROCEDURE — 99024 POSTOP FOLLOW-UP VISIT: CPT | Performed by: SURGERY

## 2022-04-09 ASSESSMENT — ENCOUNTER SYMPTOMS
BLOOD IN STOOL: 0
TROUBLE SWALLOWING: 0
VOMITING: 0
EYE REDNESS: 0
NAUSEA: 0
CHOKING: 0
VOICE CHANGE: 0
PHOTOPHOBIA: 0
APNEA: 0
CONSTIPATION: 0
ABDOMINAL PAIN: 1
DIARRHEA: 0
RHINORRHEA: 0
STRIDOR: 0
ABDOMINAL DISTENTION: 0
SORE THROAT: 0
BACK PAIN: 0
CHEST TIGHTNESS: 0
EYE DISCHARGE: 0
ALLERGIC/IMMUNOLOGIC NEGATIVE: 1
ANAL BLEEDING: 0
COLOR CHANGE: 0
WHEEZING: 0
EYE ITCHING: 0
EYE PAIN: 0
SHORTNESS OF BREATH: 0
COUGH: 0
SINUS PRESSURE: 0
FACIAL SWELLING: 0
RECTAL PAIN: 0

## 2022-04-09 NOTE — PROGRESS NOTES
Bay Ramey (:  1970)     ASSESSMENT:  1.  Status post robotic repair multiple incarcerated incisional/ventral hernias with mesh, excision right medial thigh skin tag x2 and significant robotic extensive lysis of adhesions    PLAN:  1. Incisions healing well. No breakdown or bleeding. No signs of infection. No significant seroma/hematomas. 2.  Postoperative CT imaging againreviewed. No significant concerning findings. 3.  Restrictions discussed with patient. All questions answered. 4.  Stool softeners as needed  5. No signs of recurrent herniation on exam today. 6.  Binder as needed  7. Pain control as needed. Continues to improve. Off of all narcotics. Only taking Tylenol as needed. Pain experiencing at this point most likely stitch related and expected from large repair. 8.  Follow-up as needed. 9. Signs and symptoms reviewed with patient that would be concerning and needhim to return to office for re-evaluation. Patient states He will call if He has questions or concerns. SUBJECTIVE/OBJECTIVE:    Chief Complaint   Patient presents with    Post-Op Check     s/p Robotic repair of multiple incarcerated incisional/ventral hernias with mesh (7 x 9 inch Ventralight mesh), Excision right thigh medial skin tag x2 (each 0.5 cm in diameter),Robotic extensive lysis of adhesions (1 hour)-2022 Last seen in the office 3/16/2022     GILMAR Chamberlain is a 78-year-old male who presents for follow-up status post robotic repair multiple incarcerated incisional/ventral hernias with mesh, excision right thigh medial skin tag x2 and extensive lysis of adhesions 2022. He states his abdominal pain continues to improve. Now only having stage pain mainly on the right lower quadrant region. This is more of a burning sensation and pain. Back to work. Tolerating diet. Normal bowel function. No urinary complaints. Incisions well-healed. No fever, chills or sweats.   No nausea or vomiting. No chest pain or shortness of breath. Overall feeling as though he is getting back to his baseline. Review of Systems   Constitutional: Negative for activity change, appetite change, chills, diaphoresis, fatigue, fever and unexpected weight change. HENT: Negative for congestion, dental problem, drooling, ear discharge, ear pain, facial swelling, hearing loss, mouth sores, nosebleeds, postnasal drip, rhinorrhea, sinus pressure, sneezing, sore throat, tinnitus, trouble swallowing and voice change. Eyes: Negative for photophobia, pain, discharge, redness, itching and visual disturbance. Respiratory: Negative for apnea, cough, choking, chest tightness, shortness of breath, wheezing and stridor. Cardiovascular: Negative for chest pain, palpitations and leg swelling. Gastrointestinal: Positive for abdominal pain. Negative for abdominal distention, anal bleeding, blood in stool, constipation, diarrhea, nausea, rectal pain and vomiting. Endocrine: Negative. Genitourinary: Negative for decreased urine volume, difficulty urinating, dysuria, enuresis, flank pain, frequency, genital sores, hematuria, penile discharge, penile pain, penile swelling, scrotal swelling, testicular pain and urgency. Musculoskeletal: Negative for arthralgias, back pain, gait problem, joint swelling, myalgias, neck pain and neck stiffness. Skin: Negative for color change, pallor, rash and wound. Allergic/Immunologic: Negative. Neurological: Negative for dizziness, tremors, seizures, syncope, facial asymmetry, speech difficulty, weakness, light-headedness, numbness and headaches. Hematological: Negative for adenopathy. Does not bruise/bleed easily. Psychiatric/Behavioral: Negative for agitation, behavioral problems, confusion, decreased concentration, dysphoric mood, hallucinations, self-injury, sleep disturbance and suicidal ideas. The patient is not nervous/anxious and is not hyperactive.         Past Medical History:   Diagnosis Date    Atrial fibrillation ([address]     FAP (familial adenomatous polyposis)     Hyperlipidemia     diet    Nausea & vomiting     Osteoarthritis     Tachycardia     with surgery       Past Surgical History:   Procedure Laterality Date    BRAIN TUMOR EXCISION  12/19/2001    3-    CARDIAC CATHETERIZATION  10/01/2012    COLON SURGERY  01/01/2000    large colon-internal pouch    COLONOSCOPY  05/2021    Dr. Eric Other  12/31/2012    HERNIA REPAIR N/A 2/17/2022    ROBOTIC [address] [place], LYSIS OF ADHESIONS, EXCISION OF THIGHT THIGH SKIN TAGS X2 performed by Sebastián Hollingsworth MD [facility]  12/31/2012    Dr Kimmie Ho- Excision of Mass in the nape of Neck    NOSE SURGERY  age 14    OTHER SURGICAL HISTORY  01/01/2008    lump removed from testicles    SHOULDER SURGERY  1989 92 94    right x1, left x2-rotator cuff    TEMPOROMANDIBULAR JOINT SURGERY  01/01/2008    TONSILLECTOMY  as a child    TOTAL COLECTOMY  2000    UPPER GASTROINTESTINAL ENDOSCOPY  05/2021    Dr. Lizet Stack       Current Outpatient Medications   Medication Sig Dispense Refill    omeprazole (PRILOSEC) 40 MG delayed release capsule Take 40 mg by mouth daily      acetaminophen (TYLENOL) 500 MG tablet Take 1,000 mg by mouth every 6 hours as needed for Pain       DHEA 10 MG CAPS Take 1 tablet by mouth daily       No current facility-administered medications for this visit.        Allergies   Allergen Reactions    Latex     Dye [Iodides] Hives       Family History   Problem Relation Age of Onset   [facility] Cancer Mother         brain    Heart Disease Mother     Cancer Father         prostate    High Blood Pressure Father     Heart Disease Father     COPD Father     Heart Disease Sister     Kidney Disease Sister     Lung Disease Sister     Heart Disease Maternal Grandmother     Heart Disease Maternal Grandfather     Heart Disease Paternal Grandmother     Cancer Unstable Housing in the Last Year: Not on file     Vitals:    04/06/22 0805   BP: 138/70   Site: Right Upper Arm   Position: Sitting   Cuff Size: Medium Adult   Pulse: 74   Resp: 18   Temp: 96.4 °F (35.8 °C)   TempSrc: Temporal   SpO2: 96%   Weight: 220 lb 3.2 oz (99.9 kg)   Height: 5' 9\" (1.753 m)     Body mass index is 32.52 kg/m². Wt Readings from Last 3 Encounters:   04/06/22 220 lb 3.2 oz (99.9 kg)   03/16/22 219 lb 9.6 oz (99.6 kg)   02/28/22 227 lb (103 kg)     Physical Exam  Vitals reviewed. Constitutional:       General: He is not in acute distress. Appearance: He is well-developed. He is not diaphoretic. HENT:      Head: Normocephalic and atraumatic. Right Ear: External ear normal.      Left Ear: External ear normal.      Nose: Nose normal.   Eyes:      General: No scleral icterus. Right eye: No discharge. Left eye: No discharge. Conjunctiva/sclera: Conjunctivae normal.   Cardiovascular:      Rate and Rhythm: Normal rate and regular rhythm. Heart sounds: Normal heart sounds. Pulmonary:      Effort: Pulmonary effort is normal. No respiratory distress. Breath sounds: Normal breath sounds. No wheezing or rales. Chest:      Chest wall: No tenderness. Abdominal:      General: Bowel sounds are normal. There is no distension. Palpations: Abdomen is soft. There is no mass. Tenderness: There is abdominal tenderness in the right lower quadrant. There is no guarding or rebound. Musculoskeletal:         General: No tenderness. Normal range of motion. Cervical back: Normal range of motion and neck supple. Skin:     General: Skin is warm and dry. Coloration: Skin is not pale. Findings: No erythema or rash. Neurological:      Mental Status: He is alert and oriented to person, place, and time. Cranial Nerves: No cranial nerve deficit. Psychiatric:         Behavior: Behavior normal.         Thought Content:  Thought content normal. Judgment: Judgment normal.       Lab Results   Component Value Date    WBC 9.0 02/28/2022    HGB 16.4 02/28/2022    HCT 49.5 02/28/2022    MCV 96.9 (H) 02/28/2022     02/28/2022     Lab Results   Component Value Date     02/19/2022    K 4.0 02/19/2022     02/19/2022    CO2 24 02/19/2022     Lab Results   Component Value Date    CREATININE 0.7 02/19/2022     Lab Results   Component Value Date    ALT 41 02/19/2022    AST 20 02/19/2022    ALKPHOS 72 02/19/2022    BILITOT 0.8 02/19/2022     Lab Results   Component Value Date    LIPASE 26.7 02/19/2022       Patient Active Problem List   Diagnosis    Family history of non-Hodgkin's lymphoma    Visual disturbance    FAP (familial adenomatous polyposis)    Atrial fibrillation (HCC)---paroxysmal    Hyperlipidemia    Abnormal EMG    Bilateral carpal tunnel syndrome    Right cervical radiculopathy    Incarcerated ventral hernia       An electronic signature was used to authenticate this note.     --Amina Santos MD

## 2022-04-13 ENCOUNTER — CARE COORDINATION (OUTPATIENT)
Dept: OTHER | Facility: CLINIC | Age: 52
End: 2022-04-13

## 2022-04-13 NOTE — CARE COORDINATION
Ambulatory Care Coordination Note  2022  CM Risk Score: 4  [place] 10 Year Mortality Risk Score: 4%     ACC: Maikol Rasheed, RN    Summary Note: [facility]) contacted the patient by telephone to follow up on progress, discuss new issues or concerns, and reinforce/ provide patient education. Verified name and  with patient as identifiers. Pt completed last follow up with surgeon on 22 said he has been released unless something changes and he would need to go back. Pt said he is only taking tylenol for pain but rarely even then. He is eating and drinking well, no issues. Normal elimination patterns for patient. 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 Reinforced/ Provided Education:  Discussed red flags and appropriate site of care based on symptoms and resources available to patient including: PCP  Specialist  Urgent care clinics  [employer]. Importance and benefits of: Follow up with PCP and specialist, medication adherence, self monitoring and reporting of symptoms. Plan: Graduation from program. Pt is back to previous level of function and is independent at home. Pt has no further identified care mtg needs at this time. Pt has my contact information if needed for future reference. ACM will sign off at this time. Care Coordination Interventions    Program Enrollment: Rising Risk  Referral from Primary Care Provider: No  Suggested Interventions and Community Resources  Disease Specific Clinic: Completed (Comment: Dr Margie Major ( general surgery))         Goals Addressed                 This Visit's Progress     COMPLETED: Conditions and Symptoms        I will schedule office visits, as directed by my provider. I will notify my provider of any barriers to my plan of care. I will follow my Zone Management tool to seek urgent or emergent care.   I will notify my provider of any symptoms that indicate a worsening of my condition. Barriers: overwhelmed by complexity of regimen  Plan for overcoming my barriers: work with ACM,   Confidence: 10/10  Anticipated Goal Completion Date: completed              Prior to Admission medications    Medication Sig Start Date End Date Taking? Authorizing Provider   omeprazole (PRILOSEC) 40 MG delayed release capsule Take 40 mg by mouth daily    Historical Provider, MD   acetaminophen (TYLENOL) 500 MG tablet Take 1,000 mg by mouth every 6 hours as needed for Pain     Historical Provider, MD   DHEA 10 MG CAPS Take 1 tablet by mouth daily    Historical Provider, MD Jacques Hatch BSN, [address] [facility] Manager  [phone]      No future appointments.

## 2023-03-12 ENCOUNTER — APPOINTMENT (OUTPATIENT)
Dept: CT IMAGING | Age: 53
End: 2023-03-12
Payer: COMMERCIAL

## 2023-03-12 ENCOUNTER — HOSPITAL ENCOUNTER (EMERGENCY)
Age: 53
Discharge: HOME | End: 2023-03-12
Payer: COMMERCIAL

## 2023-03-12 VITALS
SYSTOLIC BLOOD PRESSURE: 128 MMHG | TEMPERATURE: 98.2 F | OXYGEN SATURATION: 96 % | HEIGHT: 69 IN | BODY MASS INDEX: 34.07 KG/M2 | DIASTOLIC BLOOD PRESSURE: 78 MMHG | RESPIRATION RATE: 17 BRPM | WEIGHT: 230 LBS | HEART RATE: 80 BPM

## 2023-03-12 DIAGNOSIS — S20.411A ABRASION OF RIGHT SIDE OF BACK, INITIAL ENCOUNTER: ICD-10-CM

## 2023-03-12 DIAGNOSIS — R51.9 NONINTRACTABLE HEADACHE, UNSPECIFIED CHRONICITY PATTERN, UNSPECIFIED HEADACHE TYPE: Primary | ICD-10-CM

## 2023-03-12 DIAGNOSIS — L08.9 FINGER INFECTION: ICD-10-CM

## 2023-03-12 DIAGNOSIS — S60.459A ACUTE FOREIGN BODY OF FINGERNAIL, INITIAL ENCOUNTER: ICD-10-CM

## 2023-03-12 LAB
ALBUMIN SERPL BCG-MCNC: 4.3 G/DL (ref 3.5–5.1)
ALP SERPL-CCNC: 81 U/L (ref 38–126)
ALT SERPL W/O P-5'-P-CCNC: 35 U/L (ref 11–66)
ANION GAP SERPL CALC-SCNC: 7 MEQ/L (ref 8–16)
AST SERPL-CCNC: 21 U/L (ref 5–40)
BASOPHILS ABSOLUTE: 0.1 THOU/MM3 (ref 0–0.1)
BASOPHILS NFR BLD AUTO: 0.5 %
BILIRUB SERPL-MCNC: 0.4 MG/DL (ref 0.3–1.2)
BUN SERPL-MCNC: 14 MG/DL (ref 7–22)
CALCIUM SERPL-MCNC: 9.4 MG/DL (ref 8.5–10.5)
CHLORIDE SERPL-SCNC: 100 MEQ/L (ref 98–111)
CO2 SERPL-SCNC: 26 MEQ/L (ref 23–33)
CREAT SERPL-MCNC: 0.8 MG/DL (ref 0.4–1.2)
CRP SERPL-MCNC: 0.5 MG/DL (ref 0–1)
DEPRECATED RDW RBC AUTO: 45.8 FL (ref 35–45)
EOSINOPHIL NFR BLD AUTO: 1.5 %
EOSINOPHILS ABSOLUTE: 0.2 THOU/MM3 (ref 0–0.4)
ERYTHROCYTE [DISTWIDTH] IN BLOOD BY AUTOMATED COUNT: 12.8 % (ref 11.5–14.5)
ERYTHROCYTE [SEDIMENTATION RATE] IN BLOOD BY WESTERGREN METHOD: 1 MM/HR (ref 0–10)
GFR SERPL CREATININE-BSD FRML MDRD: > 60 ML/MIN/1.73M2
GLUCOSE SERPL-MCNC: 109 MG/DL (ref 70–108)
HCT VFR BLD AUTO: 48.8 % (ref 42–52)
HGB BLD-MCNC: 16.6 GM/DL (ref 14–18)
IMM GRANULOCYTES # BLD AUTO: 0.04 THOU/MM3 (ref 0–0.07)
IMM GRANULOCYTES NFR BLD AUTO: 0.4 %
LYMPHOCYTES ABSOLUTE: 1.6 THOU/MM3 (ref 1–4.8)
LYMPHOCYTES NFR BLD AUTO: 14.3 %
MCH RBC QN AUTO: 33.1 PG (ref 26–33)
MCHC RBC AUTO-ENTMCNC: 34 GM/DL (ref 32.2–35.5)
MCV RBC AUTO: 97.4 FL (ref 80–94)
MONOCYTES ABSOLUTE: 1.3 THOU/MM3 (ref 0.4–1.3)
MONOCYTES NFR BLD AUTO: 11.5 %
NEUTROPHILS NFR BLD AUTO: 71.8 %
NRBC BLD AUTO-RTO: 0 /100 WBC
OSMOLALITY SERPL CALC.SUM OF ELEC: 267.4 MOSMOL/KG (ref 275–300)
PLATELET # BLD AUTO: 306 THOU/MM3 (ref 130–400)
PMV BLD AUTO: 9.2 FL (ref 9.4–12.4)
POTASSIUM SERPL-SCNC: 4.1 MEQ/L (ref 3.5–5.2)
PROT SERPL-MCNC: 7.6 G/DL (ref 6.1–8)
RBC # BLD AUTO: 5.01 MILL/MM3 (ref 4.7–6.1)
SEGMENTED NEUTROPHILS ABSOLUTE COUNT: 7.8 THOU/MM3 (ref 1.8–7.7)
SODIUM SERPL-SCNC: 133 MEQ/L (ref 135–145)
WBC # BLD AUTO: 10.9 THOU/MM3 (ref 4.8–10.8)

## 2023-03-12 PROCEDURE — 96365 THER/PROPH/DIAG IV INF INIT: CPT

## 2023-03-12 PROCEDURE — 6360000002 HC RX W HCPCS: Performed by: PHYSICIAN ASSISTANT

## 2023-03-12 PROCEDURE — 86140 C-REACTIVE PROTEIN: CPT

## 2023-03-12 PROCEDURE — 90715 TDAP VACCINE 7 YRS/> IM: CPT | Performed by: PHYSICIAN ASSISTANT

## 2023-03-12 PROCEDURE — 70450 CT HEAD/BRAIN W/O DYE: CPT

## 2023-03-12 PROCEDURE — 90471 IMMUNIZATION ADMIN: CPT | Performed by: PHYSICIAN ASSISTANT

## 2023-03-12 PROCEDURE — 99284 EMERGENCY DEPT VISIT MOD MDM: CPT

## 2023-03-12 PROCEDURE — 85651 RBC SED RATE NONAUTOMATED: CPT

## 2023-03-12 PROCEDURE — 85025 COMPLETE CBC W/AUTO DIFF WBC: CPT

## 2023-03-12 PROCEDURE — 36415 COLL VENOUS BLD VENIPUNCTURE: CPT

## 2023-03-12 PROCEDURE — 96375 TX/PRO/DX INJ NEW DRUG ADDON: CPT

## 2023-03-12 PROCEDURE — 80053 COMPREHEN METABOLIC PANEL: CPT

## 2023-03-12 PROCEDURE — 2500000003 HC RX 250 WO HCPCS: Performed by: PHYSICIAN ASSISTANT

## 2023-03-12 RX ORDER — CEPHALEXIN 500 MG/1
500 CAPSULE ORAL 4 TIMES DAILY
Qty: 40 CAPSULE | Refills: 0 | Status: SHIPPED | OUTPATIENT
Start: 2023-03-12 | End: 2023-03-22

## 2023-03-12 RX ORDER — LIDOCAINE HYDROCHLORIDE 20 MG/ML
5 INJECTION, SOLUTION EPIDURAL; INFILTRATION; INTRACAUDAL; PERINEURAL ONCE
Status: COMPLETED | OUTPATIENT
Start: 2023-03-12 | End: 2023-03-12

## 2023-03-12 RX ORDER — DIPHENHYDRAMINE HYDROCHLORIDE 50 MG/ML
25 INJECTION INTRAMUSCULAR; INTRAVENOUS ONCE
Status: COMPLETED | OUTPATIENT
Start: 2023-03-12 | End: 2023-03-12

## 2023-03-12 RX ORDER — KETOROLAC TROMETHAMINE 30 MG/ML
30 INJECTION, SOLUTION INTRAMUSCULAR; INTRAVENOUS ONCE
Status: COMPLETED | OUTPATIENT
Start: 2023-03-12 | End: 2023-03-12

## 2023-03-12 RX ORDER — PROCHLORPERAZINE EDISYLATE 5 MG/ML
10 INJECTION INTRAMUSCULAR; INTRAVENOUS ONCE
Status: COMPLETED | OUTPATIENT
Start: 2023-03-12 | End: 2023-03-12

## 2023-03-12 RX ADMIN — PROCHLORPERAZINE EDISYLATE 10 MG: 5 INJECTION INTRAMUSCULAR; INTRAVENOUS at 04:17

## 2023-03-12 RX ADMIN — Medication 2000 MG: at 05:04

## 2023-03-12 RX ADMIN — DIPHENHYDRAMINE HYDROCHLORIDE 25 MG: 50 INJECTION, SOLUTION INTRAMUSCULAR; INTRAVENOUS at 04:17

## 2023-03-12 RX ADMIN — LIDOCAINE HYDROCHLORIDE 5 ML: 20 INJECTION, SOLUTION EPIDURAL; INFILTRATION; INTRACAUDAL; PERINEURAL at 04:50

## 2023-03-12 RX ADMIN — TETANUS TOXOID, REDUCED DIPHTHERIA TOXOID AND ACELLULAR PERTUSSIS VACCINE, ADSORBED 0.5 ML: 5; 2.5; 8; 8; 2.5 SUSPENSION INTRAMUSCULAR at 04:14

## 2023-03-12 RX ADMIN — KETOROLAC TROMETHAMINE 30 MG: 30 INJECTION, SOLUTION INTRAMUSCULAR at 04:17

## 2023-03-12 ASSESSMENT — PAIN SCALES - GENERAL
PAINLEVEL_OUTOF10: 4
PAINLEVEL_OUTOF10: 9
PAINLEVEL_OUTOF10: 4
PAINLEVEL_OUTOF10: 10

## 2023-03-12 ASSESSMENT — PAIN DESCRIPTION - FREQUENCY: FREQUENCY: CONTINUOUS

## 2023-03-12 ASSESSMENT — ENCOUNTER SYMPTOMS
VOMITING: 0
COUGH: 0
PHOTOPHOBIA: 0
ABDOMINAL PAIN: 0
SORE THROAT: 0
NAUSEA: 1
SINUS PRESSURE: 0
EYE PAIN: 0
DIARRHEA: 0
SHORTNESS OF BREATH: 0
RHINORRHEA: 0

## 2023-03-12 ASSESSMENT — PAIN - FUNCTIONAL ASSESSMENT
PAIN_FUNCTIONAL_ASSESSMENT: 0-10

## 2023-03-12 ASSESSMENT — PAIN DESCRIPTION - ORIENTATION: ORIENTATION: LOWER

## 2023-03-12 ASSESSMENT — PAIN DESCRIPTION - ONSET: ONSET: ON-GOING

## 2023-03-12 ASSESSMENT — PAIN DESCRIPTION - PAIN TYPE: TYPE: ACUTE PAIN

## 2023-03-12 ASSESSMENT — PAIN DESCRIPTION - LOCATION: LOCATION: HEAD;HAND

## 2023-03-12 NOTE — ED PROVIDER NOTES
[facility] [address] [address] EMERGENCY DEPT      Pt Name: Salty Macedo  MRN: [id number]  [place] 1970  Date of evaluation: 3/12/2023  Provider: Amrik Shirley PA-C    CHIEF COMPLAINT       Chief Complaint   Patient presents with    Headache       Nurses Notes reviewed and I agree except as noted in the HPI. HISTORY OF PRESENT ILLNESS    Salty Macedo is a 47 y.o. male who presents by private vehicle from home with his wife for multiple issues. Wife provides additional history. 1) splinter-3 days ago patient got a piece of wood stuck underneath his right index fingernail. He has tried to remove it but has been unable. He has experienced increasing throbbing pain that is disrupting his sleep. His finger is swollen and he is having difficulty moving it. The patient is right-hand dominant. He is unsure when his last tetanus shot was. 2) headache-patient has had a posterior headache for 1-1/2 weeks. It is sharp and constant. There is nausea but no vomiting. Patient has had minor headaches previously but nothing to this extent and for this duration. Patient has tried Tylenol, ibuprofen, and tramadol without relief. The headache is worse with sitting up. Patient has a history of FAP. 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 There were no symptoms at that time. 3) patient has had left forehead muscle spasm all day. 4) right low back pain. Patient was walking backwards when he tripped and fell hurting his low right back. There is an abrasion to the area. Patient has been able to walk normally since. Patient denies head injury or loss of consciousness. Patient denies chest pain, dyspnea, vomiting, dizziness, photophobia, blurred vision, fever, chills, or other complaints. REVIEW OF SYSTEMS     Review of Systems   Constitutional:  Negative for appetite change, chills and fever.    HENT:  Negative for congestion, ear pain, rhinorrhea, sinus pressure and sore throat. Eyes:  Negative for photophobia, pain and visual disturbance. Respiratory:  Negative for cough and shortness of breath. Cardiovascular:  Negative for chest pain. Gastrointestinal:  Positive for nausea. Negative for abdominal pain, diarrhea and vomiting. Endocrine: Negative for polyuria. Genitourinary:  Negative for difficulty urinating and frequency. Musculoskeletal:  Positive for myalgias. Negative for gait problem, neck pain and neck stiffness. Skin:  Positive for wound. Negative for rash. Neurological:  Positive for headaches. Negative for dizziness, facial asymmetry, speech difficulty, weakness, light-headedness and numbness. Psychiatric/Behavioral:  Positive for sleep disturbance. Negative for confusion. PAST MEDICAL HISTORY    has a past medical history of Atrial fibrillation ([facility] [address] FAP (familial adenomatous polyposis), Hyperlipidemia, Nausea & vomiting, Osteoarthritis, and Tachycardia. SURGICAL HISTORY      has a past surgical history that includes Colonoscopy (05/2021); Colon surgery (01/01/2000); Temporomandibular joint surgery (01/01/2008); shoulder surgery (1989 92 94); Tonsillectomy (as a child); Nose surgery (age 14); Brain tumor excision (12/19/2001); other surgical history (01/01/2008); Cardiac catheterization (10/01/2012); Neck surgery (12/31/2012); cyst removal (12/31/2012); total colectomy (2000); Upper gastrointestinal endoscopy (05/2021); and hernia repair (N/A, 2/17/2022).     CURRENT MEDICATIONS       Discharge Medication List as of 3/12/2023  6:49 AM        CONTINUE these medications which have NOT CHANGED    Details   omeprazole (PRILOSEC) 40 MG delayed release capsule Take 40 mg by mouth dailyHistorical Med      acetaminophen (TYLENOL) 500 MG tablet Take 1,000 mg by mouth every 6 hours as needed for Pain Historical Med      DHEA 10 MG CAPS Take 1 tablet by mouth dailyHistorical Med             ALLERGIES     is allergic to latex and dye [iodides].    FAMILY HISTORY     He indicated that his mother is alive. He indicated that his father is alive. He indicated that his sister is alive. He indicated that his maternal grandmother is . He indicated that his maternal grandfather is . He indicated that his paternal grandmother is . He indicated that his paternal grandfather is .   family history includes COPD in his father; Cancer in his father, mother, and paternal grandmother; Diabetes in his paternal grandfather; Heart Disease in his father, maternal grandfather, maternal grandmother, mother, paternal grandmother, and sister; High Blood Pressure in his father; Kidney Disease in his sister; Lung Disease in his sister.    SOCIAL HISTORY    reports that he has never smoked. He has never used smokeless tobacco. He reports current alcohol use of about 4.0 standard drinks per week. He reports that he does not use drugs.    PHYSICAL EXAM     INITIAL VITALS:  height is 5' 9\" (1.753 m) and weight is 230 lb (104.3 kg). His oral temperature is 98.2 °F (36.8 °C). His blood pressure is 128/78 and his pulse is 80. His respiration is 17 and oxygen saturation is 96%.    Physical Exam  Vitals and nursing note reviewed.   Constitutional:       General: He is not in acute distress.     Appearance: He is well-developed. He is not toxic-appearing or diaphoretic.   HENT:      Head: Normocephalic and atraumatic.      Right Ear: Hearing, tympanic membrane and ear canal normal. No hemotympanum.      Left Ear: Hearing, tympanic membrane and ear canal normal. No hemotympanum.      Nose: Nose normal. No rhinorrhea.      Mouth/Throat:      Lips: Pink.      Mouth: Mucous membranes are moist. Mucous membranes are not dry.      Pharynx: Uvula midline.   Eyes:      General: Lids are normal.      Extraocular Movements: Extraocular movements intact.      Conjunctiva/sclera: Conjunctivae normal.      Pupils: Pupils are equal, round, and reactive to light.  Neck:      Trachea: No tracheal deviation. Meningeal: Brudzinski's sign and Kernig's sign absent. Comments: 6 point range of motion tested and noted full, with no rigidity or meningismus. Cardiovascular:      Rate and Rhythm: Normal rate and regular rhythm. Heart sounds: Normal heart sounds. Pulmonary:      Effort: Pulmonary effort is normal. No respiratory distress. Breath sounds: Normal breath sounds. No decreased breath sounds or wheezing. Abdominal:      General: There is no distension. Palpations: Abdomen is soft. Abdomen is not rigid. Tenderness: There is no abdominal tenderness. There is no guarding. Musculoskeletal:      Right hand: Swelling present. Decreased range of motion (Index). Hands:       Cervical back: Normal range of motion and neck supple. No spinous process tenderness or muscular tenderness. Lumbar back: No bony tenderness. Back:       Comments: Extremities well perfused; Good strength appreciated in all muscle groups. Foreign body noted beneath the right index fingernail   Lymphadenopathy:      Cervical: No cervical adenopathy. Skin:     General: Skin is warm and dry. Coloration: Skin is not pale. Findings: No bruising or rash. Neurological:      General: No focal deficit present. Mental Status: He is alert and oriented to person, place, and time. GCS: GCS eye subscore is 4. GCS verbal subscore is 5. GCS motor subscore is 6. Cranial Nerves: No cranial nerve deficit. Sensory: Sensation is intact. No sensory deficit. Motor: Motor function is intact. Gait: Gait is intact. Gait normal.      Comments: Cranial nerves II-XII intact. Psychiatric:         Mood and Affect: Mood normal.         Speech: Speech normal.         Behavior: Behavior normal. Behavior is cooperative. Thought Content:  Thought content normal.       DIFFERENTIAL DIAGNOSIS:   Including but not limited to: Tension headache, migraine headache, tenosynovitis, foreign body, dehydration    Diagnoses Considered but I have low suspicion of:   ICH, intracranial mass, osteomyelitis    DIAGNOSTIC RESULTS     EKG: All EKG's are interpreted by [facility] Phys[facility] who either signs or Co-signs this chart in the absence of a cardiologist.  None    RADIOLOGY: non-plain film images(s) such as CT,Ultrasound and MRI are read by the radiologist.  Plain radiographic images are visualized and preliminarily interpreted by the emergency physician unless otherwise stated below. CT HEAD WO CONTRAST    (Results Pending)       LABS:   Labs Reviewed   CBC WITH AUTO DIFFERENTIAL - Abnormal; Notable for the following components:       Result Value    WBC 10.9 (*)     MCV 97.4 (*)     MCH 33.1 (*)     RDW-SD 45.8 (*)     MPV 9.2 (*)     Segs Absolute 7.8 (*)     All other components within normal limits   COMPREHENSIVE METABOLIC PANEL - Abnormal; Notable for the following components:    Glucose 109 (*)     Sodium 133 (*)     All other components within normal limits   ANION GAP - Abnormal; Notable for the following components:    Anion Gap 7.0 (*)     All other components within normal limits   OSMOLALITY - Abnormal; Notable for the following components:    Osmolality Calc 267.4 (*)     All other components within normal limits   SEDIMENTATION RATE   C-REACTIVE PROTEIN   GLOMERULAR FILTRATION RATE, ESTIMATED       EMERGENCY DEPARTMENT COURSE:   Vitals:    Vitals:    03/12/23 0312 03/12/23 0429 03/12/23 0533 03/12/23 0650   BP: (!) 156/90 (!) 159/94 133/81 128/78   Pulse: 81 80 81 80   Resp: 20 20 18 17   Temp: 98.2 °F (36.8 °C)      TempSrc: Oral      SpO2: 96% 96% 96% 96%   Weight: 230 lb (104.3 kg)      Height: 5' 9\" (1.753 m)          MDM:  The patient was seen and evaluated within the ED today for foreign body Neath the right index fingernail, headache, and abrasion to the right low back.  On exam, I appreciated a neurologically intact patient with no signs of meningitis. The index finger was swollen with decreased range of motion which seem to be due to the swelling. Small splinter noted beneath the nail. Old records were reviewed. Within the department, I observed the patient's vital signs to be within acceptable range. Radiological studies within the department were pending at the end of my shift. Laboratory work was reassuring. Within the department, the patient was treated with foreign body removal, Benadryl, Compazine, Toradol, saline, Tdap, and Ancef. I observed the patient's condition to modestly improve during the duration of their stay. The patient has remained stable in the ER with good vital signs. On re-exam, the patient was non-toxic appearing and maintained intact neurological status. No distress was apparent. There have been no signs of sinister etiology for patient's headache. I have considered ICH, sepsis, tenosynovitis and this patient's presentation was not consistent with such entities and therefore, no further testing was warranted. The patient was comfortable with the plan of discharge home and to follow up with [facility]. Care of this patient was endorsed to [facility] PA-C at the end of my shift as his CT head was still pending. However I had given patient and his wife anticipatory guidance. Guy Haile        CRITICAL CARE:   None    CONSULTS:  None    PROCEDURES:  Foreign Body    Date/Time: 3/12/2023 4:50 AM  Performed by: Amrik Shirley PA-C  Authorized by: Amrik Shirley PA-C     Consent:     Consent obtained:  Verbal    Consent given by:  Patient    Risks, benefits, and alternatives were discussed: yes      Risks discussed:  Pain and incomplete removal  Universal protocol:     Procedure explained and questions answered to patient or proxy's satisfaction: yes      Patient identity confirmed:  Verbally with patient  Location:     Location:  Finger    Finger location:  R index finger    Depth:  Subungual    Tendon involvement:  None  Pre-procedure details:     Imaging:  None    Neurovascular status: intact    Anesthesia:     Anesthesia method:  Local infiltration    Local anesthetic:  Lidocaine 2% w/o epi  Procedure type:     Procedure complexity:  Simple  Procedure details:     Localization method:  Visualized    Bloodless field: no      Removal mechanism: Forceps    Foreign bodies recovered:  1 (All amount of pus protruded from the area additionally)    Description:  Splinter removed in 3 pieces    Intact foreign body removal: yes    Post-procedure details:     Neurovascular status: intact      Confirmation:  No additional foreign bodies on visualization    Skin closure:  None    Dressing:  Sterile dressing and splint for protection    Procedure completion:  Tolerated well, no immediate complications  Comments:      Area was irrigated with saline      FINAL IMPRESSION      1. Nonintractable headache, unspecified chronicity pattern, unspecified headache type    2. Finger infection    3. Acute foreign body of fingernail, initial encounter    4. Abrasion of right side of back, initial encounter          DISPOSITION/PLAN     1. Nonintractable headache, unspecified chronicity pattern, unspecified headache type    2. Finger infection    3. Acute foreign body of fingernail, initial encounter    4.  Abrasion of right side of back, initial encounter        PATIENT REFERRED TO:  [address]  [phone]  On 3/13/2023  Go to [address] [place] [address] at 0800 am    DISCHARGE MEDICATIONS:  Discharge Medication List as of 3/12/2023  6:49 AM        START taking these medications    Details   cephALEXin (KEFLEX) 500 MG capsule Take 1 capsule by mouth 4 times daily for 10 days, Disp-40 capsule, R-0Normal             (Please note that portions of this note were completed with a voice recognition program.  Efforts were made to edit the dictations but occasionally words are mis-transcribed.)    Willard Bruce PA-C 03/12/23 6:25 PM    TOSHA Dominguez PA-C  03/12/23 1827

## 2023-03-12 NOTE — ED TRIAGE NOTES
Pt was accompanied to the ED by wife via lobby. Pt reported of headache and had pain. Pt states headache started yesterday around 21:00. Describe pain as sharp and constant. Pt states pain is aggravated by movement. Rates pain level as 9/10. Pt admits to taken tramadol 50mg 1tabs. Pt spouse states \"pt had brain surgery in 2011 and was told if he ever develop persistent headache should get looked at. Breathing unlabored.

## 2023-03-12 NOTE — ED PROVIDER NOTES
Addendum: Care was assumed at 6 AM.  CT scan of the head was read per radiology as no acute findings. The patient was doing better after medications. We will send the patient home on Keflex for his finger infection. Final diagnosis  1. Headache  2.   Finger infection     Maurisio Martinma  03/12/23 1737

## 2024-01-09 ENCOUNTER — HOSPITAL ENCOUNTER (OUTPATIENT)
Dept: GENERAL RADIOLOGY | Age: 54
Discharge: HOME | End: 2024-01-09
Payer: COMMERCIAL

## 2024-01-09 ENCOUNTER — HOSPITAL ENCOUNTER (OUTPATIENT)
Age: 54
Discharge: HOME | End: 2024-01-09
Payer: COMMERCIAL

## 2024-01-09 ENCOUNTER — OFFICE VISIT (OUTPATIENT)
Dept: FAMILY MEDICINE CLINIC | Age: 54
End: 2024-01-09
Payer: COMMERCIAL

## 2024-01-09 ENCOUNTER — TELEPHONE (OUTPATIENT)
Dept: FAMILY MEDICINE CLINIC | Age: 54
End: 2024-01-09

## 2024-01-09 VITALS
SYSTOLIC BLOOD PRESSURE: 138 MMHG | WEIGHT: 235 LBS | TEMPERATURE: 97.5 F | HEIGHT: 69 IN | BODY MASS INDEX: 34.8 KG/M2 | OXYGEN SATURATION: 96 % | HEART RATE: 60 BPM | DIASTOLIC BLOOD PRESSURE: 86 MMHG | RESPIRATION RATE: 18 BRPM

## 2024-01-09 DIAGNOSIS — I48.0 PAROXYSMAL ATRIAL FIBRILLATION (HCC): ICD-10-CM

## 2024-01-09 DIAGNOSIS — L30.9 ECZEMA, UNSPECIFIED TYPE: ICD-10-CM

## 2024-01-09 DIAGNOSIS — R40.0 DAYTIME SLEEPINESS: ICD-10-CM

## 2024-01-09 DIAGNOSIS — M25.512 CHRONIC LEFT SHOULDER PAIN: ICD-10-CM

## 2024-01-09 DIAGNOSIS — R06.2 WHEEZING: ICD-10-CM

## 2024-01-09 DIAGNOSIS — R06.83 SNORING: ICD-10-CM

## 2024-01-09 DIAGNOSIS — D13.91 FAP (FAMILIAL ADENOMATOUS POLYPOSIS): Chronic | ICD-10-CM

## 2024-01-09 DIAGNOSIS — R06.2 WHEEZING: Primary | ICD-10-CM

## 2024-01-09 DIAGNOSIS — R06.02 SOB (SHORTNESS OF BREATH): ICD-10-CM

## 2024-01-09 DIAGNOSIS — G89.29 CHRONIC LEFT SHOULDER PAIN: ICD-10-CM

## 2024-01-09 DIAGNOSIS — B07.8 OTHER VIRAL WARTS: ICD-10-CM

## 2024-01-09 LAB
ALBUMIN SERPL BCG-MCNC: 4.3 G/DL (ref 3.5–5.1)
ALP SERPL-CCNC: 78 U/L (ref 38–126)
ALT SERPL W/O P-5'-P-CCNC: 42 U/L (ref 11–66)
ANION GAP SERPL CALC-SCNC: 12 MEQ/L (ref 8–16)
AST SERPL-CCNC: 23 U/L (ref 5–40)
BASOPHILS ABSOLUTE: 0.1 THOU/MM3 (ref 0–0.1)
BASOPHILS NFR BLD AUTO: 0.9 %
BILIRUB SERPL-MCNC: 0.8 MG/DL (ref 0.3–1.2)
BUN SERPL-MCNC: 11 MG/DL (ref 7–22)
CALCIUM SERPL-MCNC: 9.3 MG/DL (ref 8.5–10.5)
CHLORIDE SERPL-SCNC: 103 MEQ/L (ref 98–111)
CHOLEST SERPL-MCNC: 219 MG/DL (ref 100–199)
CO2 SERPL-SCNC: 24 MEQ/L (ref 23–33)
CREAT SERPL-MCNC: 0.8 MG/DL (ref 0.4–1.2)
DEPRECATED MEAN GLUCOSE BLD GHB EST-ACNC: 108 MG/DL (ref 70–126)
DEPRECATED RDW RBC AUTO: 45 FL (ref 35–45)
EOSINOPHIL NFR BLD AUTO: 3.2 %
EOSINOPHILS ABSOLUTE: 0.2 THOU/MM3 (ref 0–0.4)
ERYTHROCYTE [DISTWIDTH] IN BLOOD BY AUTOMATED COUNT: 12.5 % (ref 11.5–14.5)
GFR SERPL CREATININE-BSD FRML MDRD: > 60 ML/MIN/1.73M2
GLUCOSE SERPL-MCNC: 105 MG/DL (ref 70–108)
HBA1C MFR BLD HPLC: 5.6 % (ref 4.4–6.4)
HCT VFR BLD AUTO: 50 % (ref 42–52)
HDLC SERPL-MCNC: 48 MG/DL
HGB BLD-MCNC: 16.9 GM/DL (ref 14–18)
IMM GRANULOCYTES # BLD AUTO: 0.01 THOU/MM3 (ref 0–0.07)
IMM GRANULOCYTES NFR BLD AUTO: 0.2 %
LDLC SERPL CALC-MCNC: 149 MG/DL
LYMPHOCYTES ABSOLUTE: 1.7 THOU/MM3 (ref 1–4.8)
LYMPHOCYTES NFR BLD AUTO: 31.1 %
MCH RBC QN AUTO: 32.6 PG (ref 26–33)
MCHC RBC AUTO-ENTMCNC: 33.8 GM/DL (ref 32.2–35.5)
MCV RBC AUTO: 96.3 FL (ref 80–94)
MONOCYTES ABSOLUTE: 0.7 THOU/MM3 (ref 0.4–1.3)
MONOCYTES NFR BLD AUTO: 13.3 %
NEUTROPHILS NFR BLD AUTO: 51.3 %
NRBC BLD AUTO-RTO: 0 /100 WBC
NT-PROBNP SERPL IA-MCNC: < 36 PG/ML (ref 0–124)
PLATELET # BLD AUTO: 290 THOU/MM3 (ref 130–400)
PMV BLD AUTO: 10.4 FL (ref 9.4–12.4)
POTASSIUM SERPL-SCNC: 4.2 MEQ/L (ref 3.5–5.2)
PROT SERPL-MCNC: 7.5 G/DL (ref 6.1–8)
RBC # BLD AUTO: 5.19 MILL/MM3 (ref 4.7–6.1)
SEGMENTED NEUTROPHILS ABSOLUTE COUNT: 2.9 THOU/MM3 (ref 1.8–7.7)
SODIUM SERPL-SCNC: 139 MEQ/L (ref 135–145)
TRIGL SERPL-MCNC: 109 MG/DL (ref 0–199)
TSH SERPL DL<=0.005 MIU/L-ACNC: 1.49 UIU/ML (ref 0.4–4.2)
WBC # BLD AUTO: 5.6 THOU/MM3 (ref 4.8–10.8)

## 2024-01-09 PROCEDURE — 80053 COMPREHEN METABOLIC PANEL: CPT

## 2024-01-09 PROCEDURE — 99215 OFFICE O/P EST HI 40 MIN: CPT | Performed by: FAMILY MEDICINE

## 2024-01-09 PROCEDURE — 36415 COLL VENOUS BLD VENIPUNCTURE: CPT

## 2024-01-09 PROCEDURE — 83880 ASSAY OF NATRIURETIC PEPTIDE: CPT

## 2024-01-09 PROCEDURE — 71046 X-RAY EXAM CHEST 2 VIEWS: CPT

## 2024-01-09 PROCEDURE — 84443 ASSAY THYROID STIM HORMONE: CPT

## 2024-01-09 PROCEDURE — 85025 COMPLETE CBC W/AUTO DIFF WBC: CPT

## 2024-01-09 PROCEDURE — 80061 LIPID PANEL: CPT

## 2024-01-09 PROCEDURE — 83036 HEMOGLOBIN GLYCOSYLATED A1C: CPT

## 2024-01-09 RX ORDER — CIMETIDINE 800 MG
800 TABLET ORAL 2 TIMES DAILY
Qty: 60 TABLET | Refills: 3 | Status: SHIPPED | OUTPATIENT
Start: 2024-01-09

## 2024-01-09 RX ORDER — ALBUTEROL SULFATE 90 UG/1
2 AEROSOL, METERED RESPIRATORY (INHALATION) 4 TIMES DAILY PRN
Qty: 54 G | Refills: 1 | Status: SHIPPED | OUTPATIENT
Start: 2024-01-09

## 2024-01-09 SDOH — ECONOMIC STABILITY: INCOME INSECURITY: HOW HARD IS IT FOR YOU TO PAY FOR THE VERY BASICS LIKE FOOD, HOUSING, MEDICAL CARE, AND HEATING?: NOT HARD AT ALL

## 2024-01-09 SDOH — ECONOMIC STABILITY: FOOD INSECURITY: WITHIN THE PAST 12 MONTHS, YOU WORRIED THAT YOUR FOOD WOULD RUN OUT BEFORE YOU GOT MONEY TO BUY MORE.: NEVER TRUE

## 2024-01-09 SDOH — ECONOMIC STABILITY: HOUSING INSECURITY
IN THE LAST 12 MONTHS, WAS THERE A TIME WHEN YOU DID NOT HAVE A STEADY PLACE TO SLEEP OR SLEPT IN A SHELTER (INCLUDING NOW)?: NO

## 2024-01-09 SDOH — ECONOMIC STABILITY: FOOD INSECURITY: WITHIN THE PAST 12 MONTHS, THE FOOD YOU BOUGHT JUST DIDN'T LAST AND YOU DIDN'T HAVE MONEY TO GET MORE.: NEVER TRUE

## 2024-01-09 ASSESSMENT — PATIENT HEALTH QUESTIONNAIRE - PHQ9
2. FEELING DOWN, DEPRESSED OR HOPELESS: 0
SUM OF ALL RESPONSES TO PHQ QUESTIONS 1-9: 0
SUM OF ALL RESPONSES TO PHQ QUESTIONS 1-9: 0
SUM OF ALL RESPONSES TO PHQ9 QUESTIONS 1 & 2: 0
SUM OF ALL RESPONSES TO PHQ QUESTIONS 1-9: 0
1. LITTLE INTEREST OR PLEASURE IN DOING THINGS: 0
SUM OF ALL RESPONSES TO PHQ QUESTIONS 1-9: 0

## 2024-01-09 NOTE — PROGRESS NOTES
Wyatt Patton (:  1970) is a 53 y.o. male,Established patient, new to provider, here for evaluation of the following chief complaint(s):  Wheezing (For the last couple months he has noticed that he has been wheezing even when sitting. Has left shoulder pain and he has already had surgery and a steroid shot but still has pain and limited ROM and has warts on palm of right hand. )         ASSESSMENT/PLAN:  1. Wheezing  -     XR CHEST STANDARD (2 VW); Future  -     Comprehensive Metabolic Panel; Future  -     CBC with Auto Differential; Future  -     Hemoglobin A1C; Future  -     Lipid Panel; Future  -     TSH with Reflex; Future  -     Brain Natriuretic Peptide; Future  -     albuterol sulfate HFA (VENTOLIN HFA) 108 (90 Base) MCG/ACT inhaler; Inhale 2 puffs into the lungs 4 times daily as needed for Wheezing, Disp-54 g, R-1Normal  2. Eczema, unspecified type  3. Paroxysmal atrial fibrillation (HCC)  -     XR CHEST STANDARD (2 VW); Future  -     Comprehensive Metabolic Panel; Future  -     CBC with Auto Differential; Future  -     Hemoglobin A1C; Future  -     Lipid Panel; Future  -     TSH with Reflex; Future  -     Brain Natriuretic Peptide; Future  -     cimetidine (TAGAMET) 800 MG tablet; Take 1 tablet by mouth 2 times daily, Disp-60 tablet, R-3Normal  -     albuterol sulfate HFA (VENTOLIN HFA) 108 (90 Base) MCG/ACT inhaler; Inhale 2 puffs into the lungs 4 times daily as needed for Wheezing, Disp-54 g, R-1Normal  4. Snoring  -     XR CHEST STANDARD (2 VW); Future  -     Comprehensive Metabolic Panel; Future  -     CBC with Auto Differential; Future  -     Hemoglobin A1C; Future  -     Lipid Panel; Future  -     TSH with Reflex; Future  -     Brain Natriuretic Peptide; Future  -     cimetidine (TAGAMET) 800 MG tablet; Take 1 tablet by mouth 2 times daily, Disp-60 tablet, R-3Normal  -     albuterol sulfate HFA (VENTOLIN HFA) 108 (90 Base) MCG/ACT inhaler; Inhale 2 puffs into the lungs 4 times daily as

## 2024-01-09 NOTE — TELEPHONE ENCOUNTER
----- Message from Tala Sandoval MD sent at 1/9/2024  2:05 PM EST -----  Please let patient know that his chest x-ray shows no acute lung findings and heart size is normal.  There were a few old healed spots that suggest exposure to histoplasmosis which is common [place] or previously healed infection.     
Spoke with patient and informed. Voiced understanding   
You can access the [facility] Patient Portal, offered by [facility], by registering with the following website: [email or web address]

## 2024-01-09 NOTE — RESULT ENCOUNTER NOTE
Please let patient know that his chest x-ray shows no acute lung findings and heart size is normal.  There were a few old healed spots that suggest exposure to histoplasmosis which is common [place] or previously healed infection.

## 2024-01-15 ENCOUNTER — TELEPHONE (OUTPATIENT)
Dept: FAMILY MEDICINE CLINIC | Age: 54
End: 2024-01-15

## 2024-01-15 PROBLEM — M25.512 CHRONIC LEFT SHOULDER PAIN: Status: ACTIVE | Noted: 2024-01-15

## 2024-01-15 PROBLEM — G89.29 CHRONIC LEFT SHOULDER PAIN: Status: ACTIVE | Noted: 2024-01-15

## 2024-01-15 PROBLEM — R40.0 DAYTIME SLEEPINESS: Status: ACTIVE | Noted: 2024-01-15

## 2024-01-15 PROBLEM — B07.8 OTHER VIRAL WARTS: Status: ACTIVE | Noted: 2024-01-15

## 2024-01-15 PROBLEM — L30.9 ECZEMA: Status: ACTIVE | Noted: 2024-01-15

## 2024-01-15 ASSESSMENT — ENCOUNTER SYMPTOMS: SHORTNESS OF BREATH: 0

## 2024-01-15 NOTE — TELEPHONE ENCOUNTER
----- Message from Tala Sandoval MD sent at 1/15/2024  7:51 AM EST -----  Please let patient know that his laboratory in general is reassuring.  His cholesterol is mildly elevated and I recommend increased fiber from vegetables, whole grains, legumes and some fruit to lower this.  Reduction of sugary drinks and sugary foods also helps.  Blood cell counts, liver and kidney seem to be within acceptable limits.  His glucose was over 100 and it has been.  However his average is still within normal limits.  He is at the edge of prediabetes.  Therefore, the recommendation to lower sugary drinks and sugary foods is strongly advised again.    Additionally, with chest x-ray negative and no suggestion of active heart strain, I recommend for wheezing workup the pulmonary function test.  This was just ordered.  I also recommend that he obtain a sleep study.  Would he like to proceed with that as well?

## 2024-04-11 ENCOUNTER — OFFICE VISIT (OUTPATIENT)
Dept: FAMILY MEDICINE CLINIC | Age: 54
End: 2024-04-11

## 2024-04-11 VITALS
HEIGHT: 69 IN | DIASTOLIC BLOOD PRESSURE: 72 MMHG | HEART RATE: 81 BPM | WEIGHT: 235.13 LBS | SYSTOLIC BLOOD PRESSURE: 136 MMHG | TEMPERATURE: 97.2 F | BODY MASS INDEX: 34.82 KG/M2 | RESPIRATION RATE: 16 BRPM | OXYGEN SATURATION: 96 %

## 2024-04-11 DIAGNOSIS — R06.83 SNORING: ICD-10-CM

## 2024-04-11 DIAGNOSIS — Z90.49 H/O TOTAL COLECTOMY: ICD-10-CM

## 2024-04-11 DIAGNOSIS — B07.8 OTHER VIRAL WARTS: ICD-10-CM

## 2024-04-11 DIAGNOSIS — R40.0 DAYTIME SLEEPINESS: ICD-10-CM

## 2024-04-11 DIAGNOSIS — R06.2 WHEEZING: Primary | ICD-10-CM

## 2024-04-11 DIAGNOSIS — D13.91 FAP (FAMILIAL ADENOMATOUS POLYPOSIS): Chronic | ICD-10-CM

## 2024-04-11 RX ORDER — ALBUTEROL SULFATE 90 UG/1
2 AEROSOL, METERED RESPIRATORY (INHALATION) 4 TIMES DAILY PRN
Qty: 18 G | Refills: 3 | Status: SHIPPED | OUTPATIENT
Start: 2024-04-11

## 2024-04-11 NOTE — PROGRESS NOTES
Wyatt Patton (:  1970) is a 53 y.o. male,Established patient, here for evaluation of the following chief complaint(s):  Wheezing (States it is getting better. He would also like a script to cath ostomy care. )         ASSESSMENT/PLAN:  1. Wheezing  -     albuterol sulfate HFA (VENTOLIN HFA) 108 (90 Base) MCG/ACT inhaler; Inhale 2 puffs into the lungs 4 times daily as needed for Wheezing, Disp-18 g, R-3Normal  2. Snoring  -     albuterol sulfate HFA (VENTOLIN HFA) 108 (90 Base) MCG/ACT inhaler; Inhale 2 puffs into the lungs 4 times daily as needed for Wheezing, Disp-18 g, R-3Normal  3. Daytime sleepiness  -     albuterol sulfate HFA (VENTOLIN HFA) 108 (90 Base) MCG/ACT inhaler; Inhale 2 puffs into the lungs 4 times daily as needed for Wheezing, Disp-18 g, R-3Normal  4. FAP (familial adenomatous polyposis)  5. Other viral warts  -     96631 - MO DESTRUCTION BENIGN LESIONS UP TO 14  6. H/O total colectomy -- empties via J pouch at anus    LUCIE -- I recommended a sleep study. He is not sure if he wants to pursue. Will talk to wife.  Education given regarding risk factors for sleep apnea which she has and also benefits of treatment if he has it along with risks of not treating sleep apnea.  PFT -- information given for him to obtain.    We will check with ostomy clinic to see if there are other locations for patient on rubber tube with modified tip   -- rubber tube with tip; 1 cm outer diamater, possibly 30F  -- he wonders if he can do a larger one    **During chart review, I found a GI specialty note regarding a type of catheter that was suggested to the patient.  This was an astra/medena irrigation catheter 30 French TRM 57351.  However tip is straight and not like he showed here today.     May also check with [facility] for best ordering process    Risks, benefits, complications and alternative discussed with patient with continued consent for procedure. Cryotherapy applied to 5 warts with

## 2024-04-14 PROBLEM — Z90.49 H/O TOTAL COLECTOMY: Status: ACTIVE | Noted: 2024-04-14

## 2024-04-14 PROBLEM — R06.83 SNORING: Status: ACTIVE | Noted: 2024-04-14

## 2024-04-22 ENCOUNTER — TELEPHONE (OUTPATIENT)
Dept: FAMILY MEDICINE CLINIC | Age: 54
End: 2024-04-22

## 2024-04-22 DIAGNOSIS — D13.91 FAP (FAMILIAL ADENOMATOUS POLYPOSIS): Chronic | ICD-10-CM

## 2024-04-22 DIAGNOSIS — Z90.49 H/O TOTAL COLECTOMY: Primary | ICD-10-CM

## 2024-04-22 NOTE — TELEPHONE ENCOUNTER
Patient needing a new irrigation catheter 30 Mont[place]grin curved tip.   Please send to [facility].   I would advise patient to take his old one and see if Home Medical can identify what he needs.

## 2024-04-23 NOTE — TELEPHONE ENCOUNTER
Order faxed to [facility]  Patient notified, contact information given  Instructed to take all supplies with him to appointment  He verbalized understanding  Original order/script mailed to patient per his request

## 2024-09-04 ENCOUNTER — OFFICE VISIT (OUTPATIENT)
Dept: FAMILY MEDICINE CLINIC | Age: 54
End: 2024-09-04
Payer: COMMERCIAL

## 2024-09-04 VITALS
OXYGEN SATURATION: 96 % | DIASTOLIC BLOOD PRESSURE: 82 MMHG | HEART RATE: 73 BPM | HEIGHT: 69 IN | TEMPERATURE: 97.7 F | BODY MASS INDEX: 34.51 KG/M2 | WEIGHT: 233 LBS | RESPIRATION RATE: 18 BRPM | SYSTOLIC BLOOD PRESSURE: 138 MMHG

## 2024-09-04 DIAGNOSIS — M25.571 CHRONIC PAIN OF BOTH ANKLES: ICD-10-CM

## 2024-09-04 DIAGNOSIS — S80.869A INSECT BITE OF LOWER LEG, UNSPECIFIED LATERALITY, INITIAL ENCOUNTER: Primary | ICD-10-CM

## 2024-09-04 DIAGNOSIS — B07.8 OTHER VIRAL WARTS: ICD-10-CM

## 2024-09-04 DIAGNOSIS — M25.572 CHRONIC PAIN OF BOTH ANKLES: ICD-10-CM

## 2024-09-04 DIAGNOSIS — G89.29 CHRONIC PAIN OF BOTH ANKLES: ICD-10-CM
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
## 2024-09-04 PROCEDURE — 99214 OFFICE O/P EST MOD 30 MIN: CPT | Performed by: NURSE PRACTITIONER

## 2024-09-04 RX ORDER — TRIAMCINOLONE ACETONIDE 1 MG/G
CREAM TOPICAL
Qty: 30 G | Refills: 0 | Status: SHIPPED | OUTPATIENT
Start: 2024-09-04

## 2024-09-04 ASSESSMENT — ENCOUNTER SYMPTOMS
EYES NEGATIVE: 1
GASTROINTESTINAL NEGATIVE: 1
RESPIRATORY NEGATIVE: 1

## 2024-09-04 NOTE — PROGRESS NOTES
[facility] PROFESSIONAL [facility] MEDICINE  [address]  [place] [address]  Dept: [phone]  Loc: [phone]     Wyatt Patton (:  1970) is a 54 y.o. male, here for evaluation of the following chief complaint(s):  Referral - General (Would like a referral to [place]. Deyvi steinberg orthopaedic foot and ankle surgeon for bilateral foot pain. Dull ache, burning sensation, hard to move and bend feet. Gets better though the day. Has been going on for years but seems to be getting worse.), Poison Ivy (On both legs x 3 weeks seems to be spreading), and warts (Right hand)      ASSESSMENT/PLAN:  1. Insect bite of lower leg, unspecified laterality, initial encounter  Comments:  Will start triamcinolone cream. Will call if no improvement.  Orders:  -     triamcinolone (KENALOG) 0.1 % cream; Apply topically 2 times daily., Disp-30 g, R-0, Normal  2. Other viral warts  Assessment & Plan:    Failed multiple treatments. Will refer to dermatology.  Orders:  -     [facility] - Matthieu Prakash MD, Dermatology, [place]  3. Chronic pain of both ankles  Assessment & Plan:   Chronic, worsening (exacerbation), [facility] has recommended surgery. Patient would like to get a second opinion.  Orders:  -     External Referral To Orthopedic Surgery  -     Handicap Placard MISC; Starting Wed 2024, Disp-1 each, R-0, PrintExpires in 1 years. Dx: Chronic bilateral ankle pain      Return for Regular follow up as scheduled and as needed.    SUBJECTIVE/OBJECTIVE:  C/o posion ivy to bilateral ankles that flarred up x 3 weeks ago after being in [place]. States has been using benadryl cream due to extreme itching. Has some burning with area. Denies any drainage to area. Multiple warts to right hand. Has had a few froze and got better but have since came back and would like a referral. Bilateral achilles tendon with swelling with pain. Stabbing extreme pain 10/10. Has tried cream, otc ibuprofen with no

## 2024-09-04 NOTE — ASSESSMENT & PLAN NOTE
Chronic, worsening (exacerbation), [facility] has recommended surgery. Patient would like to get a second opinion.

## 2024-09-09 ENCOUNTER — TELEPHONE (OUTPATIENT)
Dept: FAMILY MEDICINE CLINIC | Age: 54
End: 2024-09-09

## 2024-09-09 RX ORDER — PREDNISONE 10 MG/1
10 TABLET ORAL DAILY
Qty: 45 TABLET | Refills: 0 | Status: SHIPPED | OUTPATIENT
Start: 2024-09-09

## 2024-12-11 ENCOUNTER — TRANSCRIBE ORDERS (OUTPATIENT)
Dept: ADMINISTRATIVE | Age: 54
End: 2024-12-11

## 2024-12-11 DIAGNOSIS — M25.511 ACUTE PAIN OF RIGHT SHOULDER: Primary | ICD-10-CM

## 2025-02-03 ENCOUNTER — HOSPITAL ENCOUNTER (OUTPATIENT)
Dept: MRI IMAGING | Age: 55
Discharge: HOME | End: 2025-02-03
Attending: ORTHOPAEDIC SURGERY
Payer: COMMERCIAL

## 2025-02-03 DIAGNOSIS — M25.511 ACUTE PAIN OF RIGHT SHOULDER: ICD-10-CM

## 2025-02-03 PROCEDURE — 73221 MRI JOINT UPR EXTREM W/O DYE: CPT

## 2025-06-02 ENCOUNTER — HOSPITAL ENCOUNTER (OUTPATIENT)
Dept: PHYSICAL THERAPY | Age: 55
Setting detail: THERAPIES SERIES
Discharge: HOME | End: 2025-06-02
Payer: COMMERCIAL

## 2025-06-02 PROCEDURE — 97161 PT EVAL LOW COMPLEX 20 MIN: CPT

## 2025-06-02 PROCEDURE — 97110 THERAPEUTIC EXERCISES: CPT

## 2025-06-02 NOTE — THERAPY EVALUATION
[facility]  PHYSICAL THERAPY  [x] EVALUATION  [] DAILY NOTE (LAND) [] DAILY NOTE (AQUATIC ) [] PROGRESS NOTE [] DISCHARGE NOTE    [] [facility] [place]   [x] [place] [facility]    [] [facility]   [facility]    Date: 2025  Patient Name:  Wyatt Patton  : 1970  MRN: [id number]  CSN: [id number]    Referring Practitioner Marc Al DPM [id number]      Diagnosis  Diagnoses       Z47.89 (ICD-10-CM) - Encounter for other orthopedic aftercare           Treatment Diagnosis M25.571 Pain in right ankle  M25.671 Stiffness of right ankle, not elsewhere classified  R53.1 Weakness  R26.2 Difficulty in walking   Date of Evaluation 25   Additional Pertinent History Wyatt Patton has a past medical history of Atrial fibrillation (HCC), FAP (familial adenomatous polyposis), Hyperlipidemia, Nausea & vomiting, Osteoarthritis, and Tachycardia.  he has a past surgical history that includes Colonoscopy (2021); Colon surgery (2000); Temporomandibular joint surgery (2008); shoulder surgery ( 89 ); Tonsillectomy (as a child); Nose surgery (age 12); Brain tumor excision (2001); other surgical history (2008); Cardiac catheterization (10/01/2012); Neck surgery (2012); cyst removal (2012); total colectomy (); Upper gastrointestinal endoscopy (2021); and hernia repair (N/A, 2022).     Allergies Allergies   Allergen Reactions    Latex     Dye [Iodides] Hives      Medications   Current Outpatient Medications:     predniSONE (DELTASONE) 10 MG tablet, Take 1 tablet by mouth daily Take 5 tabs daily for 3 days, then 4 tabs daily for 3 days, then 3 tabs daily for 3 days, then 2 tabs daily for 3 days, then 1 tab daily for 3 days., Disp: 45 tablet, Rfl: 0    triamcinolone (KENALOG) 0.1 % cream, Apply topically 2 times daily., Disp: 30 g, Rfl: 0    Handicap Placard MISC, by Does not apply route

## 2025-06-09 ENCOUNTER — HOSPITAL ENCOUNTER (OUTPATIENT)
Dept: PHYSICAL THERAPY | Age: 55
Setting detail: THERAPIES SERIES
Discharge: HOME | End: 2025-06-09
Payer: COMMERCIAL

## 2025-06-09 PROCEDURE — 97110 THERAPEUTIC EXERCISES: CPT

## 2025-06-09 PROCEDURE — 97116 GAIT TRAINING THERAPY: CPT

## 2025-06-09 NOTE — PROGRESS NOTES
Continue with current plan of care  []  Modify plan of care as follows:    []  Hold pending physician visit  []  Discharge    Time In 0815   Time Out 0904   Timed Code Minutes: 49 min   Total Treatment Time: 49 min       Electronically Signed by: Eveline Galindo PTA

## 2025-06-12 ENCOUNTER — HOSPITAL ENCOUNTER (OUTPATIENT)
Dept: PHYSICAL THERAPY | Age: 55
Setting detail: THERAPIES SERIES
Discharge: HOME | End: 2025-06-12
Payer: COMMERCIAL

## 2025-06-12 PROCEDURE — 97110 THERAPEUTIC EXERCISES: CPT

## 2025-06-16 ENCOUNTER — HOSPITAL ENCOUNTER (OUTPATIENT)
Dept: PHYSICAL THERAPY | Age: 55
Setting detail: THERAPIES SERIES
Discharge: HOME | End: 2025-06-16
Payer: COMMERCIAL

## 2025-06-16 PROCEDURE — 97140 MANUAL THERAPY 1/> REGIONS: CPT

## 2025-06-16 PROCEDURE — 97035 APP MDLTY 1+ULTRASOUND EA 15: CPT

## 2025-06-16 PROCEDURE — 97110 THERAPEUTIC EXERCISES: CPT

## 2025-06-16 NOTE — PROGRESS NOTES
[facility]  PHYSICAL THERAPY  [] EVALUATION  [x] DAILY NOTE (LAND) [] DAILY NOTE (AQUATIC ) [] PROGRESS NOTE [] DISCHARGE NOTE    [] [facility] [place]   [x] [place] [facility]    [] [facility]   [] [facility]    Date: 2025  Patient Name:  Wyatt Patton  : 1970  MRN: [id number]  CSN: [id number]    Referring Practitioner Marc Al DPM [id number]      Diagnosis  Diagnoses       Z47.89 (ICD-10-CM) - Encounter for other orthopedic aftercare           Treatment Diagnosis M25.571 Pain in right ankle  M25.671 Stiffness of right ankle, not elsewhere classified  R53.1 Weakness  R26.2 Difficulty in walking   Date of Evaluation 25   Additional Pertinent History Wyatt Patton has a past medical history of Atrial fibrillation (HCC), FAP (familial adenomatous polyposis), Hyperlipidemia, Nausea & vomiting, Osteoarthritis, and Tachycardia.  he has a past surgical history that includes Colonoscopy (2021); Colon surgery (2000); Temporomandibular joint surgery (2008); shoulder surgery (1989); Tonsillectomy (as a child); Nose surgery (age 12); Brain tumor excision (2001); other surgical history (2008); Cardiac catheterization (10/01/2012); Neck surgery (2012); cyst removal (2012); total colectomy (); Upper gastrointestinal endoscopy (2021); and hernia repair (N/A, 2022).     Allergies Allergies   Allergen Reactions    Latex     Dye [Iodides] Hives      Medications   Current Outpatient Medications:     predniSONE (DELTASONE) 10 MG tablet, Take 1 tablet by mouth daily Take 5 tabs daily for 3 days, then 4 tabs daily for 3 days, then 3 tabs daily for 3 days, then 2 tabs daily for 3 days, then 1 tab daily for 3 days., Disp: 45 tablet, Rfl: 0    triamcinolone (KENALOG) 0.1 % cream, Apply topically 2 times daily., Disp: 30 g, Rfl: 0    Handicap Placard MISC, by Does not apply route

## 2025-06-19 ENCOUNTER — HOSPITAL ENCOUNTER (OUTPATIENT)
Dept: PHYSICAL THERAPY | Age: 55
Setting detail: THERAPIES SERIES
Discharge: HOME | End: 2025-06-19
Payer: COMMERCIAL

## 2025-06-19 PROCEDURE — 97110 THERAPEUTIC EXERCISES: CPT

## 2025-06-19 PROCEDURE — 97035 APP MDLTY 1+ULTRASOUND EA 15: CPT

## 2025-06-19 PROCEDURE — 97140 MANUAL THERAPY 1/> REGIONS: CPT

## 2025-06-19 NOTE — PROGRESS NOTES
treatment planned outlined above.  [x]  Continue with current plan of care  []  Modify plan of care as follows:    []  Hold pending physician visit  []  Discharge    Time In 0804   Time Out 0845   Timed Code Minutes:  41 min   Total Treatment Time:  41 min       Electronically Signed by: Puja Faustin, PTA

## 2025-06-23 ENCOUNTER — APPOINTMENT (OUTPATIENT)
Dept: PHYSICAL THERAPY | Age: 55
End: 2025-06-23
Payer: COMMERCIAL

## 2025-06-26 ENCOUNTER — HOSPITAL ENCOUNTER (OUTPATIENT)
Dept: PHYSICAL THERAPY | Age: 55
Setting detail: THERAPIES SERIES
Discharge: HOME | End: 2025-06-26
Payer: COMMERCIAL

## 2025-06-26 PROCEDURE — 97035 APP MDLTY 1+ULTRASOUND EA 15: CPT

## 2025-06-26 PROCEDURE — 97110 THERAPEUTIC EXERCISES: CPT

## 2025-06-26 PROCEDURE — 97140 MANUAL THERAPY 1/> REGIONS: CPT

## 2025-06-26 NOTE — PROGRESS NOTES
[facility]  PHYSICAL THERAPY  [] EVALUATION  [x] DAILY NOTE (LAND) [] DAILY NOTE (AQUATIC ) [] PROGRESS NOTE [] DISCHARGE NOTE    [] [facility] [place]   [x] [place] [facility]    [] [facility]   [] [facility]    Date: 2025  Patient Name:  Wyatt Patton  : 1970  MRN: [id number]  CSN: [id number]    Referring Practitioner Marc Al DPM [id number]      Diagnosis  Diagnoses       Z47.89 (ICD-10-CM) - Encounter for other orthopedic aftercare           Treatment Diagnosis M25.571 Pain in right ankle  M25.671 Stiffness of right ankle, not elsewhere classified  R53.1 Weakness  R26.2 Difficulty in walking   Date of Evaluation 25   Additional Pertinent History Wyatt Patton has a past medical history of Atrial fibrillation (HCC), FAP (familial adenomatous polyposis), Hyperlipidemia, Nausea & vomiting, Osteoarthritis, and Tachycardia.  he has a past surgical history that includes Colonoscopy (2021); Colon surgery (2000); Temporomandibular joint surgery (2008); shoulder surgery (1989); Tonsillectomy (as a child); Nose surgery (age 12); Brain tumor excision (2001); other surgical history (2008); Cardiac catheterization (10/01/2012); Neck surgery (2012); cyst removal (2012); total colectomy (); Upper gastrointestinal endoscopy (2021); and hernia repair (N/A, 2022).     Allergies Allergies   Allergen Reactions    Latex     Dye [Iodides] Hives      Medications   Current Outpatient Medications:     predniSONE (DELTASONE) 10 MG tablet, Take 1 tablet by mouth daily Take 5 tabs daily for 3 days, then 4 tabs daily for 3 days, then 3 tabs daily for 3 days, then 2 tabs daily for 3 days, then 1 tab daily for 3 days., Disp: 45 tablet, Rfl: 0    triamcinolone (KENALOG) 0.1 % cream, Apply topically 2 times daily., Disp: 30 g, Rfl: 0    Handicap Placard MISC, by Does not apply route

## 2025-06-30 ENCOUNTER — HOSPITAL ENCOUNTER (OUTPATIENT)
Dept: PHYSICAL THERAPY | Age: 55
Setting detail: THERAPIES SERIES
Discharge: HOME | End: 2025-06-30
Payer: COMMERCIAL

## 2025-06-30 PROCEDURE — 97035 APP MDLTY 1+ULTRASOUND EA 15: CPT

## 2025-06-30 PROCEDURE — 97110 THERAPEUTIC EXERCISES: CPT

## 2025-06-30 PROCEDURE — 97140 MANUAL THERAPY 1/> REGIONS: CPT

## 2025-06-30 NOTE — PROGRESS NOTES
[facility]  PHYSICAL THERAPY  [] EVALUATION  [x] DAILY NOTE (LAND) [] DAILY NOTE (AQUATIC ) [] PROGRESS NOTE [] DISCHARGE NOTE    [] [facility] [place]   [x] [place] [facility]    [] [facility]   [] [facility]    Date: 2025  Patient Name:  Wyatt Patton  : 1970  MRN: [id number]  CSN: [id number]    Referring Practitioner Marc Al DPM [id number]      Diagnosis  Diagnoses       Z47.89 (ICD-10-CM) - Encounter for other orthopedic aftercare           Treatment Diagnosis M25.571 Pain in right ankle  M25.671 Stiffness of right ankle, not elsewhere classified  R53.1 Weakness  R26.2 Difficulty in walking   Date of Evaluation 25   Additional Pertinent History Wyatt Patton has a past medical history of Atrial fibrillation (HCC), FAP (familial adenomatous polyposis), Hyperlipidemia, Nausea & vomiting, Osteoarthritis, and Tachycardia.  he has a past surgical history that includes Colonoscopy (2021); Colon surgery (2000); Temporomandibular joint surgery (2008); shoulder surgery (1989); Tonsillectomy (as a child); Nose surgery (age 12); Brain tumor excision (2001); other surgical history (2008); Cardiac catheterization (10/01/2012); Neck surgery (2012); cyst removal (2012); total colectomy (); Upper gastrointestinal endoscopy (2021); and hernia repair (N/A, 2022).     Allergies Allergies   Allergen Reactions    Latex     Dye [Iodides] Hives      Medications   Current Outpatient Medications:     predniSONE (DELTASONE) 10 MG tablet, Take 1 tablet by mouth daily Take 5 tabs daily for 3 days, then 4 tabs daily for 3 days, then 3 tabs daily for 3 days, then 2 tabs daily for 3 days, then 1 tab daily for 3 days., Disp: 45 tablet, Rfl: 0    triamcinolone (KENALOG) 0.1 % cream, Apply topically 2 times daily., Disp: 30 g, Rfl: 0    Handicap Placard MISC, by Does not apply route

## 2025-07-03 ENCOUNTER — HOSPITAL ENCOUNTER (OUTPATIENT)
Dept: PHYSICAL THERAPY | Age: 55
Setting detail: THERAPIES SERIES
Discharge: HOME | End: 2025-07-03
Payer: COMMERCIAL

## 2025-07-03 PROCEDURE — 97140 MANUAL THERAPY 1/> REGIONS: CPT

## 2025-07-03 PROCEDURE — 97110 THERAPEUTIC EXERCISES: CPT

## 2025-07-03 NOTE — PROGRESS NOTES
* PLEASE SIGN, DATE AND TIME CERTIFICATION BELOW AND RETURN TO [facility] OUTPATIENT REHABILITATION (FAX #: [phone]).  ATTEST/CO-SIGN IF ACCESSING VIA INSigndatET.  THANK YOU.**    I certify that I have examined the patient below and determined that Physical Medicine and Rehabilitation service is necessary and that I approve the established plan of care for up to 90 days or as specifically noted.  Attestation, signature or co-signature of physician indicates approval of certification requirements.    ________________________ ____________  Physician Signature   Date     [facility]  PHYSICAL THERAPY  [] EVALUATION  [] DAILY NOTE (LAND) [] DAILY NOTE (AQUATIC ) [x] PROGRESS NOTE [] DISCHARGE NOTE    [] OUTPATIENT REHABILITATION CENTER [facility]   [x] [facility]    [] [facility]   [] [facility]    Date: 7/3/2025  Patient Name:  Wyatt Patton  : 1970  MRN: [id number]  CSN: [id number]    Referring Practitioner Marc Al DPM [id number]      Diagnosis  Diagnoses       Z47.89 (ICD-10-CM) - Encounter for other orthopedic aftercare           Treatment Diagnosis M25.571 Pain in right ankle  M25.671 Stiffness of right ankle, not elsewhere classified  R53.1 Weakness  R26.2 Difficulty in walking   Date of Evaluation 25   Additional Pertinent History Wyatt Patton has a past medical history of Atrial fibrillation (HCC), FAP (familial adenomatous polyposis), Hyperlipidemia, Nausea & vomiting, Osteoarthritis, and Tachycardia.  he has a past surgical history that includes Colonoscopy (2021); Colon surgery (2000); Temporomandibular joint surgery (2008); shoulder surgery ( 89 ); Tonsillectomy (as a child); Nose surgery (age 12); Brain tumor excision (2001); other surgical history (2008); Cardiac catheterization (10/01/2012); Neck surgery (2012); cyst removal (2012); total colectomy (); Upper gastrointestinal

## 2025-07-09 ENCOUNTER — HOSPITAL ENCOUNTER (OUTPATIENT)
Dept: PHYSICAL THERAPY | Age: 55
Setting detail: THERAPIES SERIES
Discharge: HOME | End: 2025-07-09
Payer: COMMERCIAL

## 2025-07-09 PROCEDURE — 97140 MANUAL THERAPY 1/> REGIONS: CPT

## 2025-07-09 PROCEDURE — 97110 THERAPEUTIC EXERCISES: CPT

## 2025-07-09 NOTE — PROGRESS NOTES
Exercise Program, Patient Education, Integrative Dry Needling, and Modalities    []  Plan of care initiated.  Plan to see patient 2 times per week for 6 weeks to address the treatment planned outlined above.  [x]  Continue with current plan of care  []  Modify plan of care as follows:  2x/wk for 4 weeks effective 7/3/25  []  Hold pending physician visit  []  Discharge    Time In 1025   Time Out 1058   Timed Code Minutes: 33 min   Total Treatment Time: 33 min       Electronically Signed by: Jennifer Yip PT

## 2025-07-14 ENCOUNTER — HOSPITAL ENCOUNTER (OUTPATIENT)
Dept: PHYSICAL THERAPY | Age: 55
Setting detail: THERAPIES SERIES
Discharge: HOME | End: 2025-07-14
Payer: COMMERCIAL

## 2025-07-14 PROCEDURE — 97110 THERAPEUTIC EXERCISES: CPT

## 2025-07-14 PROCEDURE — 97140 MANUAL THERAPY 1/> REGIONS: CPT

## 2025-07-14 NOTE — PROGRESS NOTES
[facility]  PHYSICAL THERAPY  [] EVALUATION  [x] DAILY NOTE (LAND) [] DAILY NOTE (AQUATIC ) [] PROGRESS NOTE [] DISCHARGE NOTE    [] [facility] [place]   [x] [place] [facility]    [] [facility]   [] [facility]    Date: 2025  Patient Name:  Wyatt Patton  : 1970  MRN: [id number]  CSN: [id number]    Referring Practitioner Marc Al DPM [id number]      Diagnosis  Diagnoses       Z47.89 (ICD-10-CM) - Encounter for other orthopedic aftercare           Treatment Diagnosis M25.571 Pain in right ankle  M25.671 Stiffness of right ankle, not elsewhere classified  R53.1 Weakness  R26.2 Difficulty in walking   Date of Evaluation 25   Additional Pertinent History Wyatt Patton has a past medical history of Atrial fibrillation (HCC), FAP (familial adenomatous polyposis), Hyperlipidemia, Nausea & vomiting, Osteoarthritis, and Tachycardia.  he has a past surgical history that includes Colonoscopy (2021); Colon surgery (2000); Temporomandibular joint surgery (2008); shoulder surgery ( 89 ); Tonsillectomy (as a child); Nose surgery (age 12); Brain tumor excision (2001); other surgical history (2008); Cardiac catheterization (10/01/2012); Neck surgery (2012); cyst removal (2012); total colectomy (); Upper gastrointestinal endoscopy (2021); and hernia repair (N/A, 2022).     Allergies Allergies   Allergen Reactions    Latex     Dye [Iodides] Hives      Medications   Current Outpatient Medications:     predniSONE (DELTASONE) 10 MG tablet, Take 1 tablet by mouth daily Take 5 tabs daily for 3 days, then 4 tabs daily for 3 days, then 3 tabs daily for 3 days, then 2 tabs daily for 3 days, then 1 tab daily for 3 days., Disp: 45 tablet, Rfl: 0    triamcinolone (KENALOG) 0.1 % cream, Apply topically 2 times daily., Disp: 30 g, Rfl: 0    Handicap Placard MISC, by Does not apply route

## 2025-07-16 ENCOUNTER — HOSPITAL ENCOUNTER (OUTPATIENT)
Dept: PHYSICAL THERAPY | Age: 55
Setting detail: THERAPIES SERIES
Discharge: HOME | End: 2025-07-16
Payer: COMMERCIAL

## 2025-07-16 PROCEDURE — 97140 MANUAL THERAPY 1/> REGIONS: CPT

## 2025-07-16 PROCEDURE — 97110 THERAPEUTIC EXERCISES: CPT

## 2025-07-16 NOTE — PROGRESS NOTES
[facility]  PHYSICAL THERAPY  [] EVALUATION  [x] DAILY NOTE (LAND) [] DAILY NOTE (AQUATIC ) [] PROGRESS NOTE [] DISCHARGE NOTE    [] [facility] [place]   [x] [place] [facility]    [] [facility]   [] [facility]    Date: 2025  Patient Name:  Wyatt Patton  : 1970  MRN: [id number]  CSN: [id number]    Referring Practitioner Marc Al DPM [id number]      Diagnosis  Diagnoses       Z47.89 (ICD-10-CM) - Encounter for other orthopedic aftercare           Treatment Diagnosis M25.571 Pain in right ankle  M25.671 Stiffness of right ankle, not elsewhere classified  R53.1 Weakness  R26.2 Difficulty in walking   Date of Evaluation 25   Additional Pertinent History Wyatt Patton has a past medical history of Atrial fibrillation (HCC), FAP (familial adenomatous polyposis), Hyperlipidemia, Nausea & vomiting, Osteoarthritis, and Tachycardia.  he has a past surgical history that includes Colonoscopy (2021); Colon surgery (2000); Temporomandibular joint surgery (2008); shoulder surgery ( 89 ); Tonsillectomy (as a child); Nose surgery (age 12); Brain tumor excision (2001); other surgical history (2008); Cardiac catheterization (10/01/2012); Neck surgery (2012); cyst removal (2012); total colectomy (); Upper gastrointestinal endoscopy (2021); and hernia repair (N/A, 2022).     Allergies Allergies   Allergen Reactions    Latex     Dye [Iodides] Hives      Medications   Current Outpatient Medications:     predniSONE (DELTASONE) 10 MG tablet, Take 1 tablet by mouth daily Take 5 tabs daily for 3 days, then 4 tabs daily for 3 days, then 3 tabs daily for 3 days, then 2 tabs daily for 3 days, then 1 tab daily for 3 days., Disp: 45 tablet, Rfl: 0    triamcinolone (KENALOG) 0.1 % cream, Apply topically 2 times daily., Disp: 30 g, Rfl: 0    Handicap Placard MISC, by Does not apply route

## 2025-07-21 ENCOUNTER — HOSPITAL ENCOUNTER (OUTPATIENT)
Dept: PHYSICAL THERAPY | Age: 55
Setting detail: THERAPIES SERIES
Discharge: HOME | End: 2025-07-21
Payer: COMMERCIAL

## 2025-07-21 PROCEDURE — 97140 MANUAL THERAPY 1/> REGIONS: CPT

## 2025-07-21 PROCEDURE — 97110 THERAPEUTIC EXERCISES: CPT

## 2025-07-21 NOTE — PROGRESS NOTES
Expires in 1 years. Dx: Chronic bilateral ankle pain, Disp: 1 each, Rfl: 0    Catheters MISC, Patient needing a new irrigation catheter 30 French curved tip to empty his J pouch. Please send to [facility] Disp: 1 each, Rfl: 1    albuterol sulfate HFA (VENTOLIN HFA) 108 (90 Base) MCG/ACT inhaler, Inhale 2 puffs into the lungs 4 times daily as needed for Wheezing, Disp: 18 g, Rfl: 3    cimetidine (TAGAMET) 800 MG tablet, Take 1 tablet by mouth 2 times daily (Patient not taking: Reported on 9/4/2024), Disp: 60 tablet, Rfl: 3    hydrocortisone 2.5 % cream, Apply topically 2 times daily., Disp: 28 g, Rfl: 2    omeprazole (PRILOSEC) 40 MG delayed release capsule, Take 1 capsule by mouth daily, Disp: , Rfl:     acetaminophen (TYLENOL) 500 MG tablet, Take 2 tablets by mouth every 6 hours as needed for Pain, Disp: , Rfl:     DHEA 10 MG CAPS, Take 1 tablet by mouth daily, Disp: , Rfl:       Functional Outcome Measure Used FAA   Functional Outcome Score 67 (6/2/25)       Insurance: Primary: Payor: [employer] /  /  / ,   Secondary:    Authorization Information PRE CERTIFICATION REQUIRED: Additional authorization not required after evaluation.  Additional Auth is required after 30 visits.  INSURANCE THERAPY BENEFIT: Allowed 30 visits per calendar year.  0 visits have been used.. Soft Max..   AQUATIC THERAPY COVERED: Yes  MODALITIES COVERED:  Yes, NO MASSAGE   Initial CPT Codes Requested 42863 - Therapeutic Exercise, 44749 - Manual Therapy , 01058 - Neuromuscular Re-Education , 92133 - Gait Training, 88025 - Ultrasound , and 15763 - Manual Electrical Stimulation   Progress Note Counter 4/5 for progress note (Reporting Period: 7/9/25  to   )   Recertification Date 07/31/25   Physician Follow-Up Upon completion of therapy.    Physician Orders    History of Present Illness Pt s/p right achilles tendon lengthening with bone spur shaved off on 3/28/25. Pt was splinted for 1 week, 2 weeks in cast, then walking boot. Pt used

## 2025-07-23 ENCOUNTER — HOSPITAL ENCOUNTER (OUTPATIENT)
Dept: PHYSICAL THERAPY | Age: 55
Setting detail: THERAPIES SERIES
Discharge: HOME | End: 2025-07-23
Payer: COMMERCIAL

## 2025-07-23 ENCOUNTER — OFFICE VISIT (OUTPATIENT)
Dept: FAMILY MEDICINE CLINIC | Age: 55
End: 2025-07-23
Payer: COMMERCIAL

## 2025-07-23 VITALS
HEART RATE: 73 BPM | SYSTOLIC BLOOD PRESSURE: 158 MMHG | DIASTOLIC BLOOD PRESSURE: 88 MMHG | OXYGEN SATURATION: 97 % | BODY MASS INDEX: 36.14 KG/M2 | RESPIRATION RATE: 17 BRPM | HEIGHT: 69 IN | WEIGHT: 244 LBS | TEMPERATURE: 98.2 F

## 2025-07-23 DIAGNOSIS — R06.2 WHEEZING: Primary | ICD-10-CM

## 2025-07-23 DIAGNOSIS — R06.83 SNORING: ICD-10-CM

## 2025-07-23 DIAGNOSIS — R40.0 DAYTIME SLEEPINESS: ICD-10-CM

## 2025-07-23 DIAGNOSIS — R06.02 SHORTNESS OF BREATH: ICD-10-CM

## 2025-07-23 DIAGNOSIS — R05.3 CHRONIC COUGH: ICD-10-CM

## 2025-07-23 PROCEDURE — 99214 OFFICE O/P EST MOD 30 MIN: CPT | Performed by: NURSE PRACTITIONER

## 2025-07-23 PROCEDURE — 97110 THERAPEUTIC EXERCISES: CPT

## 2025-07-23 RX ORDER — ALBUTEROL SULFATE 90 UG/1
2 INHALANT RESPIRATORY (INHALATION) 4 TIMES DAILY PRN
Qty: 18 G | Refills: 3 | Status: SHIPPED | OUTPATIENT
Start: 2025-07-23

## 2025-07-23 RX ORDER — CELECOXIB 200 MG/1
CAPSULE ORAL DAILY
COMMUNITY
Start: 2025-01-14

## 2025-07-23 SDOH — ECONOMIC STABILITY: FOOD INSECURITY: WITHIN THE PAST 12 MONTHS, THE FOOD YOU BOUGHT JUST DIDN'T LAST AND YOU DIDN'T HAVE MONEY TO GET MORE.: NEVER TRUE

## 2025-07-23 SDOH — ECONOMIC STABILITY: FOOD INSECURITY: WITHIN THE PAST 12 MONTHS, YOU WORRIED THAT YOUR FOOD WOULD RUN OUT BEFORE YOU GOT MONEY TO BUY MORE.: NEVER TRUE

## 2025-07-23 ASSESSMENT — PATIENT HEALTH QUESTIONNAIRE - PHQ9
1. LITTLE INTEREST OR PLEASURE IN DOING THINGS: NOT AT ALL
SUM OF ALL RESPONSES TO PHQ QUESTIONS 1-9: 0
2. FEELING DOWN, DEPRESSED OR HOPELESS: NOT AT ALL
SUM OF ALL RESPONSES TO PHQ QUESTIONS 1-9: 0

## 2025-07-23 NOTE — PROGRESS NOTES
[facility]  [address]  [place] [address]  Dept: [phone]  Dept Fax: [phone]  Loc: [phone]     Visit Date:  7/23/2025    Provider: EMILY Saldana - CNP        Patient:  Wyatt Patton  YOB: 1970    HPI:     Chief Complaint   Patient presents with    Wheezing     Has had the wheeze since last visit in 09/2024. When cough its a dry with at times clear.   No N/V/D/Fever       History of Present Illness  The patient is a 55-year-old male who presents today with concerns of wheezing that has been present for over a year.    He reports that his wheezing, which was previously evaluated by Dr. Sandoval multiple times last year, has worsened. He experiences occasional coughing and expectoration, but it is not severe. The wheezing intensifies at night, sometimes to the point of waking him up, and is accompanied by coughing. He does also report occasional shortness of breath. He denies any  allergies or postnasal drainage. He has a history of COVID-19 infection. He also mentions that he experiences shortness of breath when walking, but attributes this to foot pain rather than respiratory issues. He has been using an albuterol inhaler twice daily, which provides some relief.       Medications    Current Outpatient Medications:     celecoxib (CELEBREX) 200 MG capsule, daily, Disp: , Rfl:     albuterol sulfate HFA (VENTOLIN HFA) 108 (90 Base) MCG/ACT inhaler, Inhale 2 puffs into the lungs 4 times daily as needed for Wheezing, Disp: 18 g, Rfl: 3    Handicap Placard MISC, by Does not apply route Expires in 1 years. Dx: Chronic bilateral ankle pain, Disp: 1 each, Rfl: 0    Catheters MISC, Patient needing a new irrigation catheter 30 French curved tip to empty his J pouch. [facility] Disp: 1 each, Rfl: 1    hydrocortisone 2.5 % cream, Apply topically 2 times daily., Disp: 28 g, Rfl: 2

## 2025-07-23 NOTE — DISCHARGE SUMMARY
[facility]  PHYSICAL THERAPY  [] EVALUATION  [] DAILY NOTE (LAND) [] DAILY NOTE (AQUATIC ) [] PROGRESS NOTE [x] DISCHARGE NOTE    [] [facility]IL[facility] [place]   [x] [place] [facility]    [] [facility]   [] [facility]    Date: 2025  Patient Name:  Wyatt Patton  : 1970  MRN: [id number]  CSN: [id number]    Referring Practitioner Marc Al DPM [id number]      Diagnosis  Diagnoses       Z47.89 (ICD-10-CM) - Encounter for other orthopedic aftercare           Treatment Diagnosis M25.571 Pain in right ankle  M25.671 Stiffness of right ankle, not elsewhere classified  R53.1 Weakness  R26.2 Difficulty in walking   Date of Evaluation 25   Additional Pertinent History Wyatt Patton has a past medical history of Atrial fibrillation (HCC), FAP (familial adenomatous polyposis), Hyperlipidemia, Nausea & vomiting, Osteoarthritis, and Tachycardia.  he has a past surgical history that includes Colonoscopy (2021); Colon surgery (2000); Temporomandibular joint surgery (2008); shoulder surgery ( 89 ); Tonsillectomy (as a child); Nose surgery (age 12); Brain tumor excision (2001); other surgical history (2008); Cardiac catheterization (10/01/2012); Neck surgery (2012); cyst removal (2012); total colectomy (); Upper gastrointestinal endoscopy (2021); and hernia repair (N/A, 2022).     Allergies Allergies   Allergen Reactions    Latex     Dye [Iodides] Hives      Medications   Current Outpatient Medications:     predniSONE (DELTASONE) 10 MG tablet, Take 1 tablet by mouth daily Take 5 tabs daily for 3 days, then 4 tabs daily for 3 days, then 3 tabs daily for 3 days, then 2 tabs daily for 3 days, then 1 tab daily for 3 days., Disp: 45 tablet, Rfl: 0    triamcinolone (KENALOG) 0.1 % cream, Apply topically 2 times daily., Disp: 30 g, Rfl: 0    Handicap Placard MISC, by Does not apply route

## 2025-07-25 ENCOUNTER — APPOINTMENT (OUTPATIENT)
Dept: PHYSICAL THERAPY | Age: 55
End: 2025-07-25
Payer: COMMERCIAL

## 2025-08-19 ENCOUNTER — HOSPITAL ENCOUNTER (OUTPATIENT)
Dept: PULMONOLOGY | Age: 55
Discharge: HOME | End: 2025-08-19
Payer: COMMERCIAL

## 2025-08-19 DIAGNOSIS — R06.2 WHEEZING: ICD-10-CM

## 2025-08-19 DIAGNOSIS — R05.3 CHRONIC COUGH: ICD-10-CM

## 2025-08-19 PROCEDURE — 94729 DIFFUSING CAPACITY: CPT

## 2025-08-19 PROCEDURE — 94060 EVALUATION OF WHEEZING: CPT

## 2025-08-19 PROCEDURE — 94726 PLETHYSMOGRAPHY LUNG VOLUMES: CPT

## 2025-08-20 DIAGNOSIS — J45.40 MODERATE PERSISTENT ASTHMA WITHOUT COMPLICATION: Primary | ICD-10-CM

## 2025-08-20 RX ORDER — FLUTICASONE PROPIONATE 44 UG/1
1 AEROSOL, METERED RESPIRATORY (INHALATION) 2 TIMES DAILY
Qty: 10.6 G | Refills: 2 | Status: SHIPPED | OUTPATIENT
Start: 2025-08-20

## (undated) DEVICE — BANDAGE ADH W1XL3IN NAT FAB WVN FLX DURABLE N ADH PD SEAL

## (undated) DEVICE — GLOVE ORANGE PI 8   MSG9080

## (undated) DEVICE — SUTURE STRATAFIX SPRL SZ 1 L9IN ABSRB VLT CT 1 L36MM 1 2 CIR SXPD2B402

## (undated) DEVICE — GENERAL LAPAROSCOPY PACK-LF: Brand: MEDLINE INDUSTRIES, INC.

## (undated) DEVICE — SEAL

## (undated) DEVICE — GOWN,SIRUS,NON REINFRCD,LARGE,SET IN SL: Brand: MEDLINE

## (undated) DEVICE — TIP COVER ACCESSORY

## (undated) DEVICE — SOLUTION ANTIFOG VIS SYS CLEARIFY LAPSCP

## (undated) DEVICE — ELECTRO LUBE IS A SINGLE PATIENT USE DEVICE THAT IS INTENDED TO BE USED ON ELECTROSURGICAL ELECTRODES TO REDUCE STICKING.: Brand: KEY SURGICAL ELECTRO LUBE

## (undated) DEVICE — TUBING INSUFFLATOR HEAT HUMIDIFIED SMK EVAC SET PNEUMOCLEAR

## (undated) DEVICE — SUTURE STRATAFIX SYMMETRIC SZ 1 L18IN ABSRB VLT CT1 L36CM SXPP1A404

## (undated) DEVICE — ARM DRAPE

## (undated) DEVICE — COLUMN DRAPE

## (undated) DEVICE — REDUCER: Brand: ENDOWRIST

## (undated) DEVICE — SUTURE VCRL + SZ 0 L27IN ABSRB VLT L26MM UR-6 5/8 CIR VCP603H

## (undated) DEVICE — BLADELESS OBTURATOR: Brand: WECK VISTA

## (undated) DEVICE — ABDOMINAL BINDER: Brand: DEROYAL

## (undated) DEVICE — 35 ML SYRINGE LUER-LOCK TIP: Brand: MONOJECT

## (undated) DEVICE — GLOVE ORANGE PI 7   MSG9070

## (undated) DEVICE — INSUFFLATION NEEDLE TO ESTABLISH PNEUMOPERITONEUM.: Brand: INSUFFLATION NEEDLE

## (undated) DEVICE — BAG SPEC REM 224ML W4XL6IN DIA10MM 1 HND GYN DISP ENDOPCH

## (undated) DEVICE — CANNULA SEAL

## (undated) DEVICE — BASIC SINGLE BASIN BTC-LF: Brand: MEDLINE INDUSTRIES, INC.